# Patient Record
Sex: FEMALE | Race: OTHER | NOT HISPANIC OR LATINO | URBAN - METROPOLITAN AREA
[De-identification: names, ages, dates, MRNs, and addresses within clinical notes are randomized per-mention and may not be internally consistent; named-entity substitution may affect disease eponyms.]

---

## 2021-05-23 ENCOUNTER — INPATIENT (INPATIENT)
Facility: HOSPITAL | Age: 71
LOS: 8 days | Discharge: HOME | DRG: 177 | End: 2021-06-01
Attending: INTERNAL MEDICINE | Admitting: INTERNAL MEDICINE
Payer: MEDICAID

## 2021-05-23 VITALS
SYSTOLIC BLOOD PRESSURE: 131 MMHG | TEMPERATURE: 99 F | RESPIRATION RATE: 18 BRPM | OXYGEN SATURATION: 80 % | DIASTOLIC BLOOD PRESSURE: 81 MMHG | HEART RATE: 108 BPM

## 2021-05-23 DIAGNOSIS — Z29.9 ENCOUNTER FOR PROPHYLACTIC MEASURES, UNSPECIFIED: ICD-10-CM

## 2021-05-23 DIAGNOSIS — R09.02 HYPOXEMIA: ICD-10-CM

## 2021-05-23 DIAGNOSIS — Z78.9 OTHER SPECIFIED HEALTH STATUS: Chronic | ICD-10-CM

## 2021-05-23 DIAGNOSIS — U07.1 COVID-19: ICD-10-CM

## 2021-05-23 DIAGNOSIS — J96.01 ACUTE RESPIRATORY FAILURE WITH HYPOXIA: ICD-10-CM

## 2021-05-23 LAB
ALBUMIN SERPL ELPH-MCNC: 2.3 G/DL — LOW (ref 3.5–5)
ALP SERPL-CCNC: 80 U/L — SIGNIFICANT CHANGE UP (ref 40–120)
ALT FLD-CCNC: 45 U/L DA — SIGNIFICANT CHANGE UP (ref 10–60)
ANION GAP SERPL CALC-SCNC: 9 MMOL/L — SIGNIFICANT CHANGE UP (ref 5–17)
APTT BLD: 28.1 SEC — SIGNIFICANT CHANGE UP (ref 27.5–35.5)
AST SERPL-CCNC: 55 U/L — HIGH (ref 10–40)
BASOPHILS # BLD AUTO: 0.02 K/UL — SIGNIFICANT CHANGE UP (ref 0–0.2)
BASOPHILS NFR BLD AUTO: 0.2 % — SIGNIFICANT CHANGE UP (ref 0–2)
BILIRUB SERPL-MCNC: 0.9 MG/DL — SIGNIFICANT CHANGE UP (ref 0.2–1.2)
BUN SERPL-MCNC: 23 MG/DL — HIGH (ref 7–18)
CALCIUM SERPL-MCNC: 8.8 MG/DL — SIGNIFICANT CHANGE UP (ref 8.4–10.5)
CHLORIDE SERPL-SCNC: 104 MMOL/L — SIGNIFICANT CHANGE UP (ref 96–108)
CO2 SERPL-SCNC: 23 MMOL/L — SIGNIFICANT CHANGE UP (ref 22–31)
CREAT SERPL-MCNC: 0.66 MG/DL — SIGNIFICANT CHANGE UP (ref 0.5–1.3)
D DIMER BLD IA.RAPID-MCNC: 558 NG/ML DDU — HIGH
EOSINOPHIL # BLD AUTO: 0.01 K/UL — SIGNIFICANT CHANGE UP (ref 0–0.5)
EOSINOPHIL NFR BLD AUTO: 0.1 % — SIGNIFICANT CHANGE UP (ref 0–6)
FERRITIN SERPL-MCNC: 628 NG/ML — HIGH (ref 15–150)
GLUCOSE SERPL-MCNC: 121 MG/DL — HIGH (ref 70–99)
HCT VFR BLD CALC: 40.2 % — SIGNIFICANT CHANGE UP (ref 34.5–45)
HGB BLD-MCNC: 13.5 G/DL — SIGNIFICANT CHANGE UP (ref 11.5–15.5)
IMM GRANULOCYTES NFR BLD AUTO: 0.8 % — SIGNIFICANT CHANGE UP (ref 0–1.5)
INR BLD: 1.21 RATIO — HIGH (ref 0.88–1.16)
LYMPHOCYTES # BLD AUTO: 0.65 K/UL — LOW (ref 1–3.3)
LYMPHOCYTES # BLD AUTO: 6.3 % — LOW (ref 13–44)
MCHC RBC-ENTMCNC: 30.3 PG — SIGNIFICANT CHANGE UP (ref 27–34)
MCHC RBC-ENTMCNC: 33.6 GM/DL — SIGNIFICANT CHANGE UP (ref 32–36)
MCV RBC AUTO: 90.3 FL — SIGNIFICANT CHANGE UP (ref 80–100)
MONOCYTES # BLD AUTO: 0.49 K/UL — SIGNIFICANT CHANGE UP (ref 0–0.9)
MONOCYTES NFR BLD AUTO: 4.8 % — SIGNIFICANT CHANGE UP (ref 2–14)
NEUTROPHILS # BLD AUTO: 9 K/UL — HIGH (ref 1.8–7.4)
NEUTROPHILS NFR BLD AUTO: 87.8 % — HIGH (ref 43–77)
NRBC # BLD: 0 /100 WBCS — SIGNIFICANT CHANGE UP (ref 0–0)
NT-PROBNP SERPL-SCNC: 148 PG/ML — HIGH (ref 0–125)
PLATELET # BLD AUTO: 608 K/UL — HIGH (ref 150–400)
POTASSIUM SERPL-MCNC: 4 MMOL/L — SIGNIFICANT CHANGE UP (ref 3.5–5.3)
POTASSIUM SERPL-SCNC: 4 MMOL/L — SIGNIFICANT CHANGE UP (ref 3.5–5.3)
PROT SERPL-MCNC: 7.4 G/DL — SIGNIFICANT CHANGE UP (ref 6–8.3)
PROTHROM AB SERPL-ACNC: 14.3 SEC — HIGH (ref 10.6–13.6)
RBC # BLD: 4.45 M/UL — SIGNIFICANT CHANGE UP (ref 3.8–5.2)
RBC # FLD: 12.3 % — SIGNIFICANT CHANGE UP (ref 10.3–14.5)
SARS-COV-2 RNA SPEC QL NAA+PROBE: DETECTED
SODIUM SERPL-SCNC: 136 MMOL/L — SIGNIFICANT CHANGE UP (ref 135–145)
TROPONIN I SERPL-MCNC: <0.015 NG/ML — SIGNIFICANT CHANGE UP (ref 0–0.04)
WBC # BLD: 10.25 K/UL — SIGNIFICANT CHANGE UP (ref 3.8–10.5)
WBC # FLD AUTO: 10.25 K/UL — SIGNIFICANT CHANGE UP (ref 3.8–10.5)

## 2021-05-23 PROCEDURE — 99285 EMERGENCY DEPT VISIT HI MDM: CPT

## 2021-05-23 PROCEDURE — 71045 X-RAY EXAM CHEST 1 VIEW: CPT | Mod: 26

## 2021-05-23 RX ORDER — ENOXAPARIN SODIUM 100 MG/ML
40 INJECTION SUBCUTANEOUS DAILY
Refills: 0 | Status: DISCONTINUED | OUTPATIENT
Start: 2021-05-23 | End: 2021-06-01

## 2021-05-23 RX ORDER — REMDESIVIR 5 MG/ML
100 INJECTION INTRAVENOUS EVERY 24 HOURS
Refills: 0 | Status: DISCONTINUED | OUTPATIENT
Start: 2021-05-24 | End: 2021-05-25

## 2021-05-23 RX ORDER — DEXAMETHASONE 0.5 MG/5ML
6 ELIXIR ORAL DAILY
Refills: 0 | Status: COMPLETED | OUTPATIENT
Start: 2021-05-24 | End: 2021-06-01

## 2021-05-23 RX ORDER — REMDESIVIR 5 MG/ML
INJECTION INTRAVENOUS
Refills: 0 | Status: DISCONTINUED | OUTPATIENT
Start: 2021-05-23 | End: 2021-05-25

## 2021-05-23 RX ORDER — DEXAMETHASONE 0.5 MG/5ML
6 ELIXIR ORAL ONCE
Refills: 0 | Status: COMPLETED | OUTPATIENT
Start: 2021-05-23 | End: 2021-05-23

## 2021-05-23 RX ORDER — ZINC SULFATE TAB 220 MG (50 MG ZINC EQUIVALENT) 220 (50 ZN) MG
220 TAB ORAL DAILY
Refills: 0 | Status: DISCONTINUED | OUTPATIENT
Start: 2021-05-23 | End: 2021-06-01

## 2021-05-23 RX ORDER — CHOLECALCIFEROL (VITAMIN D3) 125 MCG
5000 CAPSULE ORAL DAILY
Refills: 0 | Status: DISCONTINUED | OUTPATIENT
Start: 2021-05-23 | End: 2021-06-01

## 2021-05-23 RX ORDER — ACETAMINOPHEN 500 MG
650 TABLET ORAL EVERY 6 HOURS
Refills: 0 | Status: DISCONTINUED | OUTPATIENT
Start: 2021-05-23 | End: 2021-06-01

## 2021-05-23 RX ORDER — REMDESIVIR 5 MG/ML
200 INJECTION INTRAVENOUS EVERY 24 HOURS
Refills: 0 | Status: COMPLETED | OUTPATIENT
Start: 2021-05-23 | End: 2021-05-23

## 2021-05-23 RX ORDER — ASCORBIC ACID 60 MG
2000 TABLET,CHEWABLE ORAL DAILY
Refills: 0 | Status: DISCONTINUED | OUTPATIENT
Start: 2021-05-23 | End: 2021-06-01

## 2021-05-23 RX ADMIN — REMDESIVIR 200 MILLIGRAM(S): 5 INJECTION INTRAVENOUS at 22:03

## 2021-05-23 RX ADMIN — ENOXAPARIN SODIUM 40 MILLIGRAM(S): 100 INJECTION SUBCUTANEOUS at 22:03

## 2021-05-23 RX ADMIN — Medication 6 MILLIGRAM(S): at 15:16

## 2021-05-23 RX ADMIN — ZINC SULFATE TAB 220 MG (50 MG ZINC EQUIVALENT) 220 MILLIGRAM(S): 220 (50 ZN) TAB at 22:03

## 2021-05-23 RX ADMIN — Medication 2000 MILLIGRAM(S): at 22:06

## 2021-05-23 NOTE — H&P ADULT - PROBLEM SELECTOR PLAN 3
IMPROVE VTE Individual Risk Assessment  RISK                                                                Points  [  ] Previous VTE                                                  3  [  ] Thrombophilia                                               2  [  ] Lower limb paralysis                                      2        (unable to hold up >15 seconds)    [  ] Current Cancer                                              2         (within 6 months)  [x  ] Immobilization > 24 hrs                                1  [  ] ICU/CCU stay > 24 hours                              1  [ x ] Age > 60                                                      1  IMPROVE VTE Score ___2______  Lovenox for DVT prophylaxis

## 2021-05-23 NOTE — ED ADULT NURSE NOTE - BMI (KG/M2)
General telephone message left for the patient to contact the office regarding her upcoming appointment.  I will attempt to contact the patient once again tomorrow.  
Phone message left for the patient once again today.  I have instructed her to contact the office to schedule a telephone encounter or reschedule her visit.  If she does not do so I will continue to plan to see her on 03/30 as scheduled.  
Telephone visit performed today due to COVID 19.  History of Present Illness:  Telephone call place the patient today which she has now subsequently returned the call to the office.  We discussed her left nondisplaced distal radius fracture as well as her left thumb comminuted distal phalanx fracture after her fall on 01/11/2019.  She is doing well and denies pain.  Her therapy's ended early due to COVID 19; however she states that she only had 2 visits remaining.  She has been consistent with her home exercise program and has no significant limitations in her activities of daily living.  Assessment:  Left distal radius fracture, nondisplaced; subsequent encounter  Left thumb comminuted distal phalanx fracture; subsequent encounter  Plan:  At this time she is doing quite well and has very little limitations in her activities of daily living.  I have encouraged her to continue with her home exercise program.  We will see her back again on an as-needed basis.  
21.5

## 2021-05-23 NOTE — H&P ADULT - HISTORY OF PRESENT ILLNESS
72 y/o female with no significant pmhx presents to ED with c/o body ache and difficulty breathing x 8 days. Patient notes  is admitted to ICU in [place] for Covid. Patient endorses she has leg pain and fever (100.4F),  denies nausea, vomiting, chest pain, calf swelling, diarrhea and abdominal pain. No other known complaints. NKDA.   On arrival noted 80% on RA, now on NC3L 96%. Pt didn't take covid vaccine. taking no meds at home.    [facility] FULL CODE

## 2021-05-23 NOTE — ED PROVIDER NOTE - OBJECTIVE STATEMENT
30 y/o male with no significant pmhx presents to ED with c/o body ache and difficulty breathing. Patient notes recent admission to [place] for Covid. Patient denies nausea, vomiting, chest pain, calf swelling, and abdominal pain. No other known complaints. NKDA. 70 y/o female with no significant pmhx presents to ED with c/o body ache and difficulty breathing. Patient notes  had recent admission to [place] for Covid. Patient denies nausea, vomiting, chest pain, calf swelling, and abdominal pain. No other known complaints. NKDA. noted hypoxia on RA on arrival

## 2021-05-23 NOTE — ED PROVIDER NOTE - CLINICAL SUMMARY MEDICAL DECISION MAKING FREE TEXT BOX
Patient presenting for hypoxia. known covid contact. likely hypoxia 2/2 covid. will obtain lab, dimer, cxr. supplement o2, reassess

## 2021-05-23 NOTE — H&P ADULT - ASSESSMENT
70 y/o female with no significant pmhx presents to ED with c/o body ache and difficulty breathing x 8 days. Patient notes  is admitted to ICU in [place] for Covid. Pt was admitted for acute hypoxic respiratory failure.     On arrival noted 80% on RA, now on NC3L 96%. Pt didn't take covid vaccine. taking no meds at home.

## 2021-05-23 NOTE — H&P ADULT - ATTENDING COMMENTS
COVID positive with subjective c/o SOB and objective mild hypoxia (95% on 3lpm)    -F/U COVID Ab counts  -F/U CT chest angio  -Start on IV dexamethasone  -Start on Iv Remdesivir   -Alessandra IV hydration  -O2 Support vias NC O2 for now  -Pulmonary consult (Dr Boggs)  -Cardiology consult (Dr Turcios)  -GI/DVT PPX COVID positive with subjective c/o SOB and objective mild hypoxia (95% on 3lpm)    -F/U COVID Ab counts  -F/U CT chest angio pending CXR (at present no parameters for PE concern)  -Start on IV dexamethasone  -Start on Iv Remdesivir   -Vitamin C 2,000mg q12 / Zinc 220mg Daily / Vit D Daily   -Alessandra IV hydration  -O2 Support vias NC O2 for now  -Pulmonary consult (Dr Boggs)  -Cardiology consult (Dr Turcios)  -GI/DVT PPX

## 2021-05-23 NOTE — H&P ADULT - PROBLEM SELECTOR PLAN 1
Now on NC 3L spO2: 96%  likely due to covid PNA  c/w oxygen supplement PRN   monitor respiratory status

## 2021-05-23 NOTE — ED PROVIDER NOTE - RESPIRATORY, MLM
Detail Level: Simple Initiate Treatment: Ketoconazole cream bid to the feet and HC 2.5% cream bid prn flares and OTC Zeasorb AF powder qd Breath sounds clear and equal bilaterally.

## 2021-05-23 NOTE — H&P ADULT - PROBLEM SELECTOR PLAN 2
CT chest showed b/l patchy opacities  starting dexamethasone IV 6g daily x 10 days  will start remdesivir x 5 days pending approval, if antibody is negative, DC  oxygen supplement  Monitor respiratory status   c/w vit C, zinc, vit D per attending

## 2021-05-23 NOTE — ED ADULT NURSE NOTE - NSIMPLEMENTINTERV_GEN_ALL_ED
Implemented All Fall with Harm Risk Interventions:  [place] to call system. Call bell, personal items and telephone within reach. Instruct patient to call for assistance. Room bathroom lighting operational. Non-slip footwear when patient is off stretcher. Physically safe environment: no spills, clutter or unnecessary equipment. Stretcher in lowest position, wheels locked, appropriate side rails in place. Provide visual cue, wrist band, yellow gown, etc. Monitor gait and stability. Monitor for mental status changes and reorient to person, place, and time. Review medications for side effects contributing to fall risk. Reinforce activity limits and safety measures with patient and family. Provide visual clues: red socks.

## 2021-05-24 LAB
24R-OH-CALCIDIOL SERPL-MCNC: 24.3 NG/ML — LOW (ref 30–80)
A1C WITH ESTIMATED AVERAGE GLUCOSE RESULT: 6 % — HIGH (ref 4–5.6)
ALBUMIN SERPL ELPH-MCNC: 2.1 G/DL — LOW (ref 3.5–5)
ALP SERPL-CCNC: 83 U/L — SIGNIFICANT CHANGE UP (ref 40–120)
ALT FLD-CCNC: 41 U/L DA — SIGNIFICANT CHANGE UP (ref 10–60)
ANION GAP SERPL CALC-SCNC: 11 MMOL/L — SIGNIFICANT CHANGE UP (ref 5–17)
AST SERPL-CCNC: 40 U/L — SIGNIFICANT CHANGE UP (ref 10–40)
BASOPHILS # BLD AUTO: 0.01 K/UL — SIGNIFICANT CHANGE UP (ref 0–0.2)
BASOPHILS NFR BLD AUTO: 0.2 % — SIGNIFICANT CHANGE UP (ref 0–2)
BILIRUB SERPL-MCNC: 0.6 MG/DL — SIGNIFICANT CHANGE UP (ref 0.2–1.2)
BUN SERPL-MCNC: 20 MG/DL — HIGH (ref 7–18)
CALCIUM SERPL-MCNC: 8.7 MG/DL — SIGNIFICANT CHANGE UP (ref 8.4–10.5)
CHLORIDE SERPL-SCNC: 105 MMOL/L — SIGNIFICANT CHANGE UP (ref 96–108)
CHOLEST SERPL-MCNC: 129 MG/DL — SIGNIFICANT CHANGE UP
CO2 SERPL-SCNC: 23 MMOL/L — SIGNIFICANT CHANGE UP (ref 22–31)
COVID-19 SPIKE DOMAIN AB INTERP: POSITIVE
COVID-19 SPIKE DOMAIN ANTIBODY RESULT: 222 U/ML — HIGH
CREAT SERPL-MCNC: 0.52 MG/DL — SIGNIFICANT CHANGE UP (ref 0.5–1.3)
CRP SERPL-MCNC: 144 MG/L — HIGH
EOSINOPHIL # BLD AUTO: 0 K/UL — SIGNIFICANT CHANGE UP (ref 0–0.5)
EOSINOPHIL NFR BLD AUTO: 0 % — SIGNIFICANT CHANGE UP (ref 0–6)
ERYTHROCYTE [SEDIMENTATION RATE] IN BLOOD: 86 MM/HR — HIGH (ref 0–20)
ESTIMATED AVERAGE GLUCOSE: 126 MG/DL — HIGH (ref 68–114)
FERRITIN SERPL-MCNC: 636 NG/ML — HIGH (ref 15–150)
FOLATE SERPL-MCNC: 13.4 NG/ML — SIGNIFICANT CHANGE UP
GLUCOSE SERPL-MCNC: 156 MG/DL — HIGH (ref 70–99)
HCT VFR BLD CALC: 37.5 % — SIGNIFICANT CHANGE UP (ref 34.5–45)
HDLC SERPL-MCNC: 36 MG/DL — LOW
HGB BLD-MCNC: 12.8 G/DL — SIGNIFICANT CHANGE UP (ref 11.5–15.5)
IMM GRANULOCYTES NFR BLD AUTO: 0.9 % — SIGNIFICANT CHANGE UP (ref 0–1.5)
LACTATE SERPL-SCNC: 1.5 MMOL/L — SIGNIFICANT CHANGE UP (ref 0.7–2)
LIPID PNL WITH DIRECT LDL SERPL: 75 MG/DL — SIGNIFICANT CHANGE UP
LYMPHOCYTES # BLD AUTO: 0.81 K/UL — LOW (ref 1–3.3)
LYMPHOCYTES # BLD AUTO: 12.7 % — LOW (ref 13–44)
MAGNESIUM SERPL-MCNC: 2.5 MG/DL — SIGNIFICANT CHANGE UP (ref 1.6–2.6)
MCHC RBC-ENTMCNC: 30.7 PG — SIGNIFICANT CHANGE UP (ref 27–34)
MCHC RBC-ENTMCNC: 34.1 GM/DL — SIGNIFICANT CHANGE UP (ref 32–36)
MCV RBC AUTO: 89.9 FL — SIGNIFICANT CHANGE UP (ref 80–100)
MONOCYTES # BLD AUTO: 0.17 K/UL — SIGNIFICANT CHANGE UP (ref 0–0.9)
MONOCYTES NFR BLD AUTO: 2.7 % — SIGNIFICANT CHANGE UP (ref 2–14)
NEUTROPHILS # BLD AUTO: 5.32 K/UL — SIGNIFICANT CHANGE UP (ref 1.8–7.4)
NEUTROPHILS NFR BLD AUTO: 83.5 % — HIGH (ref 43–77)
NON HDL CHOLESTEROL: 93 MG/DL — SIGNIFICANT CHANGE UP
NRBC # BLD: 0 /100 WBCS — SIGNIFICANT CHANGE UP (ref 0–0)
PHOSPHATE SERPL-MCNC: 2.8 MG/DL — SIGNIFICANT CHANGE UP (ref 2.5–4.5)
PLATELET # BLD AUTO: 659 K/UL — HIGH (ref 150–400)
POTASSIUM SERPL-MCNC: 3.9 MMOL/L — SIGNIFICANT CHANGE UP (ref 3.5–5.3)
POTASSIUM SERPL-SCNC: 3.9 MMOL/L — SIGNIFICANT CHANGE UP (ref 3.5–5.3)
PROCALCITONIN SERPL-MCNC: 0.1 NG/ML — SIGNIFICANT CHANGE UP (ref 0.02–0.1)
PROCALCITONIN SERPL-MCNC: 0.1 NG/ML — SIGNIFICANT CHANGE UP (ref 0.02–0.1)
PROT SERPL-MCNC: 7.3 G/DL — SIGNIFICANT CHANGE UP (ref 6–8.3)
RBC # BLD: 4.17 M/UL — SIGNIFICANT CHANGE UP (ref 3.8–5.2)
RBC # FLD: 12.5 % — SIGNIFICANT CHANGE UP (ref 10.3–14.5)
SARS-COV-2 IGG+IGM SERPL QL IA: 222 U/ML — HIGH
SARS-COV-2 IGG+IGM SERPL QL IA: POSITIVE
SODIUM SERPL-SCNC: 139 MMOL/L — SIGNIFICANT CHANGE UP (ref 135–145)
TRIGL SERPL-MCNC: 92 MG/DL — SIGNIFICANT CHANGE UP
TROPONIN I SERPL-MCNC: <0.015 NG/ML — SIGNIFICANT CHANGE UP (ref 0–0.04)
TSH SERPL-MCNC: 0.38 UU/ML — SIGNIFICANT CHANGE UP (ref 0.34–4.82)
VIT B12 SERPL-MCNC: 1225 PG/ML — SIGNIFICANT CHANGE UP (ref 232–1245)
WBC # BLD: 6.37 K/UL — SIGNIFICANT CHANGE UP (ref 3.8–10.5)
WBC # FLD AUTO: 6.37 K/UL — SIGNIFICANT CHANGE UP (ref 3.8–10.5)

## 2021-05-24 RX ADMIN — ENOXAPARIN SODIUM 40 MILLIGRAM(S): 100 INJECTION SUBCUTANEOUS at 11:02

## 2021-05-24 RX ADMIN — Medication 2000 MILLIGRAM(S): at 11:02

## 2021-05-24 RX ADMIN — Medication 6 MILLIGRAM(S): at 05:23

## 2021-05-24 RX ADMIN — ZINC SULFATE TAB 220 MG (50 MG ZINC EQUIVALENT) 220 MILLIGRAM(S): 220 (50 ZN) TAB at 11:02

## 2021-05-24 RX ADMIN — REMDESIVIR 500 MILLIGRAM(S): 5 INJECTION INTRAVENOUS at 22:39

## 2021-05-24 RX ADMIN — Medication 5000 UNIT(S): at 11:02

## 2021-05-24 NOTE — PROGRESS NOTE ADULT - PROBLEM SELECTOR PLAN 2
Elevated inflammatory markers like ESR, CRP, Ferritin.   Continue dexamethasone IV 6g daily x 10 days  will start remdesivir x 5 days pending approval, if antibody is negative, DC  c/w vit C, zinc, vit D per attending  Encourage prone positioning.

## 2021-05-24 NOTE — PROGRESS NOTE ADULT - SUBJECTIVE AND OBJECTIVE BOX
PGY 3 Note discussed with primary attending    Patient is a 71y old  Female who presents with a chief complaint of Acute hypoxic respiratory failure (23 May 2021 19:38)      INTERVAL HPI/OVERNIGHT EVENTS: Pt was walking to rest room and she desaturated to 87% on 4L NC and she was placed on 5L NC. She is comfortable, speaking in full sentences and no accessory muscle use. She had breakfast today. Pt had bowel movement in the morning.     MEDICATIONS  (STANDING):  ascorbic acid 2000 milliGRAM(s) Oral daily  cholecalciferol 5000 Unit(s) Oral daily  dexAMETHasone  Injectable 6 milliGRAM(s) IV Push daily  enoxaparin Injectable 40 milliGRAM(s) SubCutaneous daily  remdesivir  IVPB 100 milliGRAM(s) IV Intermittent every 24 hours  remdesivir  IVPB   IV Intermittent   zinc sulfate 220 milliGRAM(s) Oral daily    MEDICATIONS  (PRN):  acetaminophen   Tablet .. 650 milliGRAM(s) Oral every 6 hours PRN Temp greater or equal to 38C (100.4F), Mild Pain (1 - 3)      __________________________________________________  REVIEW OF SYSTEMS:    CONSTITUTIONAL: No fever,   EYES: no acute visual disturbances  NECK: No pain or stiffness  RESPIRATORY: No cough but  shortness of breath is present  CARDIOVASCULAR: No chest pain, no palpitations  GASTROINTESTINAL: No pain. No nausea or vomiting; No diarrhea   NEUROLOGICAL: No headache or numbness, no tremors  MUSCULOSKELETAL: No joint pain, no muscle pain  GENITOURINARY: no dysuria, no frequency, no hesitancy        Vital Signs Last 24 Hrs  T(C): 35.8 (24 May 2021 05:30), Max: 37.2 (23 May 2021 15:00)  T(F): 96.4 (24 May 2021 05:30), Max: 99 (23 May 2021 15:00)  HR: 69 (24 May 2021 05:30) (69 - 108)  BP: 116/46 (24 May 2021 05:40) (115/32 - 135/44)  BP(mean): 63 (24 May 2021 05:40) (52 - 65)  RR: 16 (24 May 2021 06:59) (16 - 22)  SpO2: 93% (24 May 2021 06:59) (80% - 100%)    ________________________________________________  PHYSICAL EXAM:  GENERAL: NAD  HEENT: Normocephalic;  conjunctivae and sclerae clear; moist mucous membranes;   NECK : supple  CHEST/LUNG: Bilateral crackles are present  HEART: S1 S2  regular; no murmurs, gallops or rubs  ABDOMEN: Soft, Nontender, Nondistended; Bowel sounds present  EXTREMITIES: no cyanosis; no edema; no calf tenderness  SKIN: warm and dry; no rash  NERVOUS SYSTEM:  Awake and alert; Oriented  to place, person and time ; no new deficits    _________________________________________________  LABS:                        12.8   6.37  )-----------( 659      ( 24 May 2021 11:10 )             37.5     05-24    139  |  105  |  20<H>  ----------------------------<  156<H>  3.9   |  23  |  0.52    Ca    8.7      24 May 2021 11:10  Phos  2.8     05-24  Mg     2.5     05-24    TPro  7.3  /  Alb  2.1<L>  /  TBili  0.6  /  DBili  x   /  AST  40  /  ALT  41  /  AlkPhos  83  05-24    PT/INR - ( 23 May 2021 15:27 )   PT: 14.3 sec;   INR: 1.21 ratio         PTT - ( 23 May 2021 15:27 )  PTT:28.1 sec    CAPILLARY BLOOD GLUCOSE            RADIOLOGY & ADDITIONAL TESTS:    Imaging Personally Reviewed:  YES    Consultant(s) Notes Reviewed:   YES    Care Discussed with Consultants : YES     Plan of care was discussed with patient and /or primary care giver; all questions and concerns were addressed and care was aligned with patient's wishes.

## 2021-05-24 NOTE — CONSULT NOTE ADULT - SUBJECTIVE AND OBJECTIVE BOX
Time of visit:    CHIEF COMPLAINT: Patient is a 71y old  Female who presents with a chief complaint of Acute hypoxic respiratory failure (23 May 2021 19:38)      HPI:  72 y/o female with no significant pmhx presents to ED with c/o body ache and difficulty breathing x 8 days. Patient notes  is admitted to ICU in [place] for Covid. Patient endorses she has leg pain and fever (100.4F),  denies nausea, vomiting, chest pain, calf swelling, diarrhea and abdominal pain. No other known complaints. NKDA.   On arrival noted 80% on RA, now on NC3L 96%. Pt didn't take covid vaccine. taking no meds at home.    [facility]: FULL CODE   (23 May 2021 19:38)   Patient seen and examined.     PAST MEDICAL & SURGICAL HISTORY:  No pertinent past medical history    No pertinent past medical history    No significant past surgical history    No pertinent past surgical history        Allergies    No Known Allergies    Intolerances        MEDICATIONS  (STANDING):  ascorbic acid 2000 milliGRAM(s) Oral daily  cholecalciferol 5000 Unit(s) Oral daily  dexAMETHasone  Injectable 6 milliGRAM(s) IV Push daily  enoxaparin Injectable 40 milliGRAM(s) SubCutaneous daily  remdesivir  IVPB 100 milliGRAM(s) IV Intermittent every 24 hours  remdesivir  IVPB   IV Intermittent   zinc sulfate 220 milliGRAM(s) Oral daily      MEDICATIONS  (PRN):  acetaminophen   Tablet .. 650 milliGRAM(s) Oral every 6 hours PRN Temp greater or equal to 38C (100.4F), Mild Pain (1 - 3)   Medications up to date at time of exam.    Medications up to date at time of exam.    FAMILY HISTORY:      SOCIAL HISTORY  Smoking History: Denies smoking exposure.   Living Condition: [   ] apartment, [   ] private house  Work History:   Travel History: denies recent travel  Illicit Substance Use: denies  Alcohol Use: denies    REVIEW OF SYSTEMS:    CONSTITUTIONAL:  No fevers, chills. Denies Night sweats.     HEENT:  denies diplopia or blurred vision, sore throat or runny nose.    CARDIOVASCULAR:  denies pressure, squeezing, tightness, or heaviness about the chest; no palpitations.    RESPIRATORY:  Episode of  SOB but no active SOB on exam. No cough. No wheezing on exam .    GASTROINTESTINAL:  denies abdominal pain, nausea, vomiting or diarrhea.    GENITOURINARY: denies dysuria, frequency or urgency.    NEUROLOGIC:  denies numbness, tingling, seizures or weakness.    PSYCHIATRIC:  denies disorder of thought or mood.    MSK: denies swelling, redness      PHYSICAL EXAMINATION:    GENERAL: The patient is a well-developed, well-nourished, in no apparent distress.     Vital Signs Last 24 Hrs  T(C): 35.8 (24 May 2021 05:30), Max: 37.2 (23 May 2021 15:00)  T(F): 96.4 (24 May 2021 05:30), Max: 99 (23 May 2021 15:00)  HR: 69 (24 May 2021 05:30) (69 - 108)  BP: 116/46 (24 May 2021 05:40) (115/32 - 135/44)  BP(mean): 63 (24 May 2021 05:40) (52 - 65)  RR: 16 (24 May 2021 06:59) (16 - 22)  SpO2: 93% (24 May 2021 06:59) (80% - 100%)   (if applicable)    Chest Tube (if applicable)    HEENT: Head is normocephalic and atraumatic. No nasal tenderness. Mucous membranes are moist.     NECK: Supple, no palpable adenopathy.    LUNGS: Clear to auscultation bilaterally with no wheezing, rales, or rhonchi. No use of accessory muscle.     HEART: S1 S2 Regular rate and no click/ rub.     ABDOMEN: Soft, nontender, and nondistended.  No hepatosplenomegaly is noted. Active bowel sounds.     EXTREMITIES: Without any cyanosis, clubbing, rash, lesions or edema.    NEUROLOGIC: Awake, alert, oriented.     SKIN: Warm and moist. Non diaphoretic.       LABS:                        12.8   6.37  )-----------( 659      ( 24 May 2021 11:10 )             37.5     05-24    139  |  105  |  20<H>  ----------------------------<  156<H>  3.9   |  23  |  0.52    Ca    8.7      24 May 2021 11:10  Phos  2.8     05-24  Mg     2.5     05-24    TPro  7.3  /  Alb  2.1<L>  /  TBili  0.6  /  DBili  x   /  AST  40  /  ALT  41  /  AlkPhos  83  05-24    PT/INR - ( 23 May 2021 15:27 )   PT: 14.3 sec;   INR: 1.21 ratio         PTT - ( 23 May 2021 15:27 )  PTT:28.1 sec      CARDIAC MARKERS ( 24 May 2021 11:10 )  <0.015 ng/mL / x     / x     / x     / x      CARDIAC MARKERS ( 23 May 2021 15:27 )  <0.015 ng/mL / x     / x     / x     / x          D-Dimer Assay, Quantitative: 558 ng/mL DDU (05-23-21 @ 15:27)    Serum Pro-Brain Natriuretic Peptide: 148 pg/mL (05-23-21 @ 15:27)    Lactate, Blood: 1.5 mmol/L (05-24-21 @ 11:10)    Procalcitonin, Serum: 0.10 ng/mL (05-23-21 @ 23:27)      MICROBIOLOGY: (if applicable)    RADIOLOGY & ADDITIONAL STUDIES:  EKG:   CXR:  ECHO:    IMPRESSION: 71y Female PAST MEDICAL & SURGICAL HISTORY:  No pertinent past medical history    No pertinent past medical history    No significant past surgical history    No pertinent past surgical history     Impression: 70 Y/O Female presented to ED with body ache and difficulty breathing x 8 days. Patient notes  is admitted to ICU in [place] for Covid.  With O2 saturation of 80% in ED with episode f Difficulty due to Acute Hypoxic Respiratory Failure secondary to Covid Pneumonia . Positive Covid 19 PCR on 05-23-21. CXR with bilateral Infiltrates.     Suggestion:  O2 saturation 92% and continue Oxygen supplementation 3L-4L NC. Monitor O2 saturation closely .   Dexamethasone 6 mg Daily x 10 days.   Pulmonary oral hygiene care.   Remdesivir  x 5 days. Awaiting Antibody test result.   DVT/ GI prophylactic.      Time of visit:    CHIEF COMPLAINT: Patient is a 71y old  Female who presents with a chief complaint of Acute hypoxic respiratory failure (23 May 2021 19:38)      HPI:  70 y/o female with no significant pmhx presents to ED with c/o body ache and difficulty breathing x 8 days. Patient notes  is admitted to ICU in [place] for Covid. Patient endorses she has leg pain and fever (100.4F),  denies nausea, vomiting, chest pain, calf swelling, diarrhea and abdominal pain. No other known complaints. NKDA.   On arrival noted 80% on RA, now on NC3L 96%. Pt didn't take covid vaccine. taking no meds at home.    [facility]: FULL CODE   (23 May 2021 19:38)   Patient seen and examined.     PAST MEDICAL & SURGICAL HISTORY:  No pertinent past medical history    No pertinent past medical history    No significant past surgical history    No pertinent past surgical history        Allergies    No Known Allergies    Intolerances        MEDICATIONS  (STANDING):  ascorbic acid 2000 milliGRAM(s) Oral daily  cholecalciferol 5000 Unit(s) Oral daily  dexAMETHasone  Injectable 6 milliGRAM(s) IV Push daily  enoxaparin Injectable 40 milliGRAM(s) SubCutaneous daily  remdesivir  IVPB 100 milliGRAM(s) IV Intermittent every 24 hours  remdesivir  IVPB   IV Intermittent   zinc sulfate 220 milliGRAM(s) Oral daily      MEDICATIONS  (PRN):  acetaminophen   Tablet .. 650 milliGRAM(s) Oral every 6 hours PRN Temp greater or equal to 38C (100.4F), Mild Pain (1 - 3)   Medications up to date at time of exam.    Medications up to date at time of exam.    FAMILY HISTORY:      SOCIAL HISTORY  Smoking History: Denies smoking exposure.   Living Condition: [   ] apartment, [   ] private house  Work History:   Travel History: denies recent travel  Illicit Substance Use: denies  Alcohol Use: denies    REVIEW OF SYSTEMS:    CONSTITUTIONAL:  No fevers, chills. Denies Night sweats.     HEENT:  denies diplopia or blurred vision, sore throat or runny nose.    CARDIOVASCULAR:  denies pressure, squeezing, tightness, or heaviness about the chest; no palpitations.    RESPIRATORY:  Episode of  SOB but no active SOB on exam. No cough. No wheezing on exam .    GASTROINTESTINAL:  denies abdominal pain, nausea, vomiting or diarrhea.    GENITOURINARY: denies dysuria, frequency or urgency.    NEUROLOGIC:  denies numbness, tingling, seizures or weakness.    PSYCHIATRIC:  denies disorder of thought or mood.    MSK: denies swelling, redness      PHYSICAL EXAMINATION:    GENERAL: The patient is a well-developed, well-nourished, in no apparent distress.     Vital Signs Last 24 Hrs  T(C): 35.8 (24 May 2021 05:30), Max: 37.2 (23 May 2021 15:00)  T(F): 96.4 (24 May 2021 05:30), Max: 99 (23 May 2021 15:00)  HR: 69 (24 May 2021 05:30) (69 - 108)  BP: 116/46 (24 May 2021 05:40) (115/32 - 135/44)  BP(mean): 63 (24 May 2021 05:40) (52 - 65)  RR: 16 (24 May 2021 06:59) (16 - 22)  SpO2: 93% (24 May 2021 06:59) (80% - 100%)   (if applicable)    Chest Tube (if applicable)    HEENT: Head is normocephalic and atraumatic. No nasal tenderness. Mucous membranes are moist.     NECK: Supple, no palpable adenopathy.    LUNGS: Clear to auscultation bilaterally with no wheezing, rales, or rhonchi. No use of accessory muscle.     HEART: S1 S2 Regular rate and no click/ rub.     ABDOMEN: Soft, nontender, and nondistended.  No hepatosplenomegaly is noted. Active bowel sounds.     EXTREMITIES: Without any cyanosis, clubbing, rash, lesions or edema.    NEUROLOGIC: Awake, alert, oriented.     SKIN: Warm and moist. Non diaphoretic.       LABS:                        12.8   6.37  )-----------( 659      ( 24 May 2021 11:10 )             37.5     05-24    139  |  105  |  20<H>  ----------------------------<  156<H>  3.9   |  23  |  0.52    Ca    8.7      24 May 2021 11:10  Phos  2.8     05-24  Mg     2.5     05-24    TPro  7.3  /  Alb  2.1<L>  /  TBili  0.6  /  DBili  x   /  AST  40  /  ALT  41  /  AlkPhos  83  05-24    PT/INR - ( 23 May 2021 15:27 )   PT: 14.3 sec;   INR: 1.21 ratio         PTT - ( 23 May 2021 15:27 )  PTT:28.1 sec      CARDIAC MARKERS ( 24 May 2021 11:10 )  <0.015 ng/mL / x     / x     / x     / x      CARDIAC MARKERS ( 23 May 2021 15:27 )  <0.015 ng/mL / x     / x     / x     / x          D-Dimer Assay, Quantitative: 558 ng/mL DDU (05-23-21 @ 15:27)    Serum Pro-Brain Natriuretic Peptide: 148 pg/mL (05-23-21 @ 15:27)    Lactate, Blood: 1.5 mmol/L (05-24-21 @ 11:10)    Procalcitonin, Serum: 0.10 ng/mL (05-23-21 @ 23:27)      MICROBIOLOGY: (if applicable)    RADIOLOGY & ADDITIONAL STUDIES:  EKG:   CXR:  ECHO:    IMPRESSION: 71y Female PAST MEDICAL & SURGICAL HISTORY:  No pertinent past medical history    No pertinent past medical history    No significant past surgical history    No pertinent past surgical history     Impression: 70 Y/O Female presented to ED with body ache and difficulty breathing x 8 days. Patient notes  is admitted to ICU in [place] for Covid.  With O2 saturation of 80% in ED with episode f Difficulty due to Acute Hypoxic Respiratory Failure secondary to Covid Pneumonia . Positive Covid 19 PCR on 05-23-21. CXR with bilateral Infiltrates.     Suggestion:  O2 saturation 92% and continue Oxygen supplementation 3L-4L NC. Monitor O2 saturation closely .   Dexamethasone 6 mg Daily x 10 days.   Pulmonary oral hygiene care.   Remdesivir  x 5 days. Awaiting Antibody test result.   DVT/ GI prophylactic.       Airborne and contact precautions.   Time of visit:    CHIEF COMPLAINT: Patient is a 71y old  Female who presents with a chief complaint of Acute hypoxic respiratory failure (23 May 2021 19:38)      HPI:  70 y/o female with no significant pmhx presents to ED with c/o body ache and difficulty breathing x 8 days. Patient notes  is admitted to ICU in [place] for Covid. Patient endorses she has leg pain and fever (100.4F),  denies nausea, vomiting, chest pain, calf swelling, diarrhea and abdominal pain. No other known complaints. NKDA.   On arrival noted 80% on RA, now on NC3L 96%. Pt didn't take covid vaccine. taking no meds at home.    [facility]: FULL CODE   (23 May 2021 19:38)   Patient seen and examined.     PAST MEDICAL & SURGICAL HISTORY:  No pertinent past medical history    No pertinent past medical history    No significant past surgical history    No pertinent past surgical history        Allergies    No Known Allergies    Intolerances        MEDICATIONS  (STANDING):  ascorbic acid 2000 milliGRAM(s) Oral daily  cholecalciferol 5000 Unit(s) Oral daily  dexAMETHasone  Injectable 6 milliGRAM(s) IV Push daily  enoxaparin Injectable 40 milliGRAM(s) SubCutaneous daily  remdesivir  IVPB 100 milliGRAM(s) IV Intermittent every 24 hours  remdesivir  IVPB   IV Intermittent   zinc sulfate 220 milliGRAM(s) Oral daily      MEDICATIONS  (PRN):  acetaminophen   Tablet .. 650 milliGRAM(s) Oral every 6 hours PRN Temp greater or equal to 38C (100.4F), Mild Pain (1 - 3)   Medications up to date at time of exam.    Medications up to date at time of exam.    FAMILY HISTORY:      SOCIAL HISTORY  Smoking History: Denies smoking exposure.   Living Condition: [   ] apartment, [   ] private house  Work History:   Travel History: denies recent travel  Illicit Substance Use: denies  Alcohol Use: denies    REVIEW OF SYSTEMS:    CONSTITUTIONAL:  No fevers, chills. Denies Night sweats.     HEENT:  denies diplopia or blurred vision, sore throat or runny nose.    CARDIOVASCULAR:  denies pressure, squeezing, tightness, or heaviness about the chest; no palpitations.    RESPIRATORY:  Episode of  SOB but no active SOB on exam. No cough. No wheezing on exam .    GASTROINTESTINAL:  denies abdominal pain, nausea, vomiting or diarrhea.    GENITOURINARY: denies dysuria, frequency or urgency.    NEUROLOGIC:  denies numbness, tingling, seizures or weakness.    PSYCHIATRIC:  denies disorder of thought or mood.    MSK: denies swelling, redness      PHYSICAL EXAMINATION:    GENERAL: The patient is a well-developed, well-nourished, in no apparent distress.     Vital Signs Last 24 Hrs  T(C): 35.8 (24 May 2021 05:30), Max: 37.2 (23 May 2021 15:00)  T(F): 96.4 (24 May 2021 05:30), Max: 99 (23 May 2021 15:00)  HR: 69 (24 May 2021 05:30) (69 - 108)  BP: 116/46 (24 May 2021 05:40) (115/32 - 135/44)  BP(mean): 63 (24 May 2021 05:40) (52 - 65)  RR: 16 (24 May 2021 06:59) (16 - 22)  SpO2: 93% (24 May 2021 06:59) (80% - 100%)   (if applicable)    Chest Tube (if applicable)    HEENT: Head is normocephalic and atraumatic. No nasal tenderness. Mucous membranes are moist.     NECK: Supple, no palpable adenopathy.    LUNGS: Clear to auscultation bilaterally with no wheezing, rales, or rhonchi. No use of accessory muscle.     HEART: S1 S2 Regular rate and no click/ rub.     ABDOMEN: Soft, nontender, and nondistended.  No hepatosplenomegaly is noted. Active bowel sounds.     EXTREMITIES: Without any cyanosis, clubbing, rash, lesions or edema.    NEUROLOGIC: Awake, alert, oriented.     SKIN: Warm and moist. Non diaphoretic.       LABS:                        12.8   6.37  )-----------( 659      ( 24 May 2021 11:10 )             37.5     05-24    139  |  105  |  20<H>  ----------------------------<  156<H>  3.9   |  23  |  0.52    Ca    8.7      24 May 2021 11:10  Phos  2.8     05-24  Mg     2.5     05-24    TPro  7.3  /  Alb  2.1<L>  /  TBili  0.6  /  DBili  x   /  AST  40  /  ALT  41  /  AlkPhos  83  05-24    PT/INR - ( 23 May 2021 15:27 )   PT: 14.3 sec;   INR: 1.21 ratio         PTT - ( 23 May 2021 15:27 )  PTT:28.1 sec      CARDIAC MARKERS ( 24 May 2021 11:10 )  <0.015 ng/mL / x     / x     / x     / x      CARDIAC MARKERS ( 23 May 2021 15:27 )  <0.015 ng/mL / x     / x     / x     / x          D-Dimer Assay, Quantitative: 558 ng/mL DDU (05-23-21 @ 15:27)    Serum Pro-Brain Natriuretic Peptide: 148 pg/mL (05-23-21 @ 15:27)    Lactate, Blood: 1.5 mmol/L (05-24-21 @ 11:10)    Procalcitonin, Serum: 0.10 ng/mL (05-23-21 @ 23:27)      MICROBIOLOGY: (if applicable)    RADIOLOGY & ADDITIONAL STUDIES:  EKG:   CXR:  ECHO:    IMPRESSION: 71y Female PAST MEDICAL & SURGICAL HISTORY:  No pertinent past medical history    No pertinent past medical history    No significant past surgical history    No pertinent past surgical history     Impression: 70 Y/O Female presented to ED with body ache and difficulty breathing x 8 days. Patient notes  is admitted to ICU in [place] for Covid.  With O2 saturation of 80% in ED with episode f Difficulty due to Acute Hypoxic Respiratory Failure secondary to  Pneumonia from covid-19 infection . . Positive Covid 19 PCR on 05-23-21. CXR with bilateral Infiltrates.     Suggestion:  O2 saturation 92% and continue Oxygen supplementation 3L-4L NC. Monitor O2 saturation closely .   Dexamethasone 6 mg Daily x 10 days.   Pulmonary oral hygiene care.   Remdesivir  x 5 days. Awaiting Antibody test result.   monitor biomarkes   DVT/ GI prophylactic.       Airborne and contact precautions.

## 2021-05-24 NOTE — PROGRESS NOTE ADULT - PROBLEM SELECTOR PLAN 1
Now on NC 5L spO2: 93%- 94%  likely due to covid PNA  Less likely PE in the setting of normal D dimer and no tachycardia  CXR showed b/l infiltrates left more than right.  c/w oxygen supplementation with spO2> 94%  monitor respiratory status  Will check inflammatory markers

## 2021-05-24 NOTE — PROGRESS NOTE ADULT - ASSESSMENT
72 y/o female with no significant pmhx presents to ED with c/o body ache and difficulty breathing x 8 days. Patient notes  is admitted to ICU in [place] for Covid. Pt was admitted for acute hypoxic respiratory failure due to COVID.     On arrival noted 80% on RA, now on NC3L 96%. Pt didn't take covid vaccine. taking no meds at home.

## 2021-05-25 LAB
ALBUMIN SERPL ELPH-MCNC: 1.9 G/DL — LOW (ref 3.5–5)
ALP SERPL-CCNC: 70 U/L — SIGNIFICANT CHANGE UP (ref 40–120)
ALT FLD-CCNC: 33 U/L DA — SIGNIFICANT CHANGE UP (ref 10–60)
ANION GAP SERPL CALC-SCNC: 7 MMOL/L — SIGNIFICANT CHANGE UP (ref 5–17)
AST SERPL-CCNC: 27 U/L — SIGNIFICANT CHANGE UP (ref 10–40)
BASOPHILS # BLD AUTO: 0.01 K/UL — SIGNIFICANT CHANGE UP (ref 0–0.2)
BASOPHILS NFR BLD AUTO: 0.1 % — SIGNIFICANT CHANGE UP (ref 0–2)
BILIRUB SERPL-MCNC: 0.4 MG/DL — SIGNIFICANT CHANGE UP (ref 0.2–1.2)
BUN SERPL-MCNC: 22 MG/DL — HIGH (ref 7–18)
CALCIUM SERPL-MCNC: 8.5 MG/DL — SIGNIFICANT CHANGE UP (ref 8.4–10.5)
CHLORIDE SERPL-SCNC: 109 MMOL/L — HIGH (ref 96–108)
CO2 SERPL-SCNC: 25 MMOL/L — SIGNIFICANT CHANGE UP (ref 22–31)
CREAT SERPL-MCNC: 0.44 MG/DL — LOW (ref 0.5–1.3)
D DIMER BLD IA.RAPID-MCNC: 492 NG/ML DDU — HIGH
EOSINOPHIL # BLD AUTO: 0.01 K/UL — SIGNIFICANT CHANGE UP (ref 0–0.5)
EOSINOPHIL NFR BLD AUTO: 0.1 % — SIGNIFICANT CHANGE UP (ref 0–6)
FERRITIN SERPL-MCNC: 607 NG/ML — HIGH (ref 15–150)
GLUCOSE SERPL-MCNC: 98 MG/DL — SIGNIFICANT CHANGE UP (ref 70–99)
HCT VFR BLD CALC: 34.7 % — SIGNIFICANT CHANGE UP (ref 34.5–45)
HCV AB S/CO SERPL IA: 0.16 S/CO — SIGNIFICANT CHANGE UP (ref 0–0.99)
HCV AB SERPL-IMP: SIGNIFICANT CHANGE UP
HGB BLD-MCNC: 11.6 G/DL — SIGNIFICANT CHANGE UP (ref 11.5–15.5)
IMM GRANULOCYTES NFR BLD AUTO: 1 % — SIGNIFICANT CHANGE UP (ref 0–1.5)
LDH SERPL L TO P-CCNC: 309 U/L — HIGH (ref 120–225)
LYMPHOCYTES # BLD AUTO: 1.25 K/UL — SIGNIFICANT CHANGE UP (ref 1–3.3)
LYMPHOCYTES # BLD AUTO: 13.7 % — SIGNIFICANT CHANGE UP (ref 13–44)
MAGNESIUM SERPL-MCNC: 2.2 MG/DL — SIGNIFICANT CHANGE UP (ref 1.6–2.6)
MCHC RBC-ENTMCNC: 30.2 PG — SIGNIFICANT CHANGE UP (ref 27–34)
MCHC RBC-ENTMCNC: 33.4 GM/DL — SIGNIFICANT CHANGE UP (ref 32–36)
MCV RBC AUTO: 90.4 FL — SIGNIFICANT CHANGE UP (ref 80–100)
MONOCYTES # BLD AUTO: 0.5 K/UL — SIGNIFICANT CHANGE UP (ref 0–0.9)
MONOCYTES NFR BLD AUTO: 5.5 % — SIGNIFICANT CHANGE UP (ref 2–14)
NEUTROPHILS # BLD AUTO: 7.29 K/UL — SIGNIFICANT CHANGE UP (ref 1.8–7.4)
NEUTROPHILS NFR BLD AUTO: 79.6 % — HIGH (ref 43–77)
NRBC # BLD: 0 /100 WBCS — SIGNIFICANT CHANGE UP (ref 0–0)
PHOSPHATE SERPL-MCNC: 2.6 MG/DL — SIGNIFICANT CHANGE UP (ref 2.5–4.5)
PLATELET # BLD AUTO: 702 K/UL — HIGH (ref 150–400)
POTASSIUM SERPL-MCNC: 3.3 MMOL/L — LOW (ref 3.5–5.3)
POTASSIUM SERPL-SCNC: 3.3 MMOL/L — LOW (ref 3.5–5.3)
PROT SERPL-MCNC: 6.4 G/DL — SIGNIFICANT CHANGE UP (ref 6–8.3)
RAPID RVP RESULT: DETECTED
RBC # BLD: 3.84 M/UL — SIGNIFICANT CHANGE UP (ref 3.8–5.2)
RBC # FLD: 12.3 % — SIGNIFICANT CHANGE UP (ref 10.3–14.5)
SARS-COV-2 RNA SPEC QL NAA+PROBE: DETECTED
SODIUM SERPL-SCNC: 141 MMOL/L — SIGNIFICANT CHANGE UP (ref 135–145)
WBC # BLD: 9.15 K/UL — SIGNIFICANT CHANGE UP (ref 3.8–10.5)
WBC # FLD AUTO: 9.15 K/UL — SIGNIFICANT CHANGE UP (ref 3.8–10.5)

## 2021-05-25 RX ORDER — POTASSIUM CHLORIDE 20 MEQ
10 PACKET (EA) ORAL
Refills: 0 | Status: DISCONTINUED | OUTPATIENT
Start: 2021-05-25 | End: 2021-05-25

## 2021-05-25 RX ORDER — MONTELUKAST 4 MG/1
10 TABLET, CHEWABLE ORAL EVERY 24 HOURS
Refills: 0 | Status: DISCONTINUED | OUTPATIENT
Start: 2021-05-25 | End: 2021-06-01

## 2021-05-25 RX ORDER — PANTOPRAZOLE SODIUM 20 MG/1
40 TABLET, DELAYED RELEASE ORAL
Refills: 0 | Status: DISCONTINUED | OUTPATIENT
Start: 2021-05-25 | End: 2021-05-25

## 2021-05-25 RX ORDER — POTASSIUM CHLORIDE 20 MEQ
40 PACKET (EA) ORAL ONCE
Refills: 0 | Status: COMPLETED | OUTPATIENT
Start: 2021-05-25 | End: 2021-05-25

## 2021-05-25 RX ORDER — FAMOTIDINE 10 MG/ML
20 INJECTION INTRAVENOUS
Refills: 0 | Status: DISCONTINUED | OUTPATIENT
Start: 2021-05-25 | End: 2021-06-01

## 2021-05-25 RX ADMIN — FAMOTIDINE 20 MILLIGRAM(S): 10 INJECTION INTRAVENOUS at 17:25

## 2021-05-25 RX ADMIN — Medication 40 MILLIEQUIVALENT(S): at 08:55

## 2021-05-25 RX ADMIN — ENOXAPARIN SODIUM 40 MILLIGRAM(S): 100 INJECTION SUBCUTANEOUS at 11:37

## 2021-05-25 RX ADMIN — PANTOPRAZOLE SODIUM 40 MILLIGRAM(S): 20 TABLET, DELAYED RELEASE ORAL at 08:55

## 2021-05-25 RX ADMIN — Medication 5000 UNIT(S): at 11:37

## 2021-05-25 RX ADMIN — Medication 2000 MILLIGRAM(S): at 11:37

## 2021-05-25 RX ADMIN — Medication 6 MILLIGRAM(S): at 05:51

## 2021-05-25 RX ADMIN — ZINC SULFATE TAB 220 MG (50 MG ZINC EQUIVALENT) 220 MILLIGRAM(S): 220 (50 ZN) TAB at 11:37

## 2021-05-25 RX ADMIN — MONTELUKAST 10 MILLIGRAM(S): 4 TABLET, CHEWABLE ORAL at 17:25

## 2021-05-25 NOTE — CONSULT NOTE ADULT - ASSESSMENT
70 y/o female with no significant pmhx presents to ED with c/o body ache and difficulty breathing x 8 days,COVID pneumonia.  1.Pulm eval noted.  2.COVID-dexamethasone,vitc and d.  3.GI and DVT prophylaxis.  4.Echocardiogram as outpatient.

## 2021-05-25 NOTE — PROGRESS NOTE ADULT - PROBLEM SELECTOR PLAN 2
Elevated inflammatory markers like ESR, CRP, Ferritin.   Continue dexamethasone IV 6g daily x 10 days  will start remdesivir x 5 days pending approval, if antibody is negative, DC  c/w vit C, zinc, vit D per attending  Encourage prone positioning. Elevated inflammatory markers like ESR, CRP, Ferritin.   - Remdes 5/24-25 DC as COVID Ab positive  - Dexa 5/24 - 06/1  c/w vit C, zinc, vit D per attending  Encourage prone positioning.  - f/u RVP

## 2021-05-25 NOTE — PROGRESS NOTE ADULT - PROBLEM SELECTOR PLAN 3
IMPROVE VTE Individual Risk Assessment  RISK                                                                Points  [  ] Previous VTE                                                  3  [  ] Thrombophilia                                               2  [  ] Lower limb paralysis                                      2        (unable to hold up >15 seconds)    [  ] Current Cancer                                              2         (within 6 months)  [x  ] Immobilization > 24 hrs                                1  [  ] ICU/CCU stay > 24 hours                              1  [ x ] Age > 60                                                      1  IMPROVE VTE Score ___2______  Lovenox for DVT prophylaxis IMPROVE VTE Individual Risk Assessment  RISK                                                                Points  [  ] Previous VTE                                                  3  [  ] Thrombophilia                                               2  [  ] Lower limb paralysis                                      2        (unable to hold up >15 seconds)    [  ] Current Cancer                                              2         (within 6 months)  [x  ] Immobilization > 24 hrs                                1  [  ] ICU/CCU stay > 24 hours                              1  [ x ] Age > 60                                                      1  IMPROVE VTE Score ___2______  Lovenox for DVT prophylaxis 40 SC QD

## 2021-05-25 NOTE — PROGRESS NOTE ADULT - PROBLEM SELECTOR PLAN 1
Now on NC 5L spO2: 93%- 94%  likely due to covid PNA  Less likely PE in the setting of normal D dimer and no tachycardia  CXR showed b/l infiltrates left more than right.  c/w oxygen supplementation with spO2> 94%  monitor respiratory status  Will check inflammatory markers Now on NC 5L spO2: 93%- 94%  likely due to covid PNA  Less likely PE in the setting of normal D dimer and no tachycardia  CXR showed b/l infiltrates left more than right.  c/w oxygen supplementation with spO2> 94%  monitor respiratory status  trend inflammatory markers  - f/u RVP  - Remdes 5/24-25 DC as COVID Ab positive  - Dexa 5/24 - 06/1

## 2021-05-25 NOTE — PROGRESS NOTE ADULT - ASSESSMENT
72 y/o female with no significant pmhx presents to ED with c/o body ache and difficulty breathing x 8 days. Patient notes  is admitted to ICU in [place] for Covid. Pt was admitted for acute hypoxic respiratory failure due to COVID.     On arrival noted 80% on RA, now on NC3L 96%. Pt didn't take covid vaccine. taking no meds at home.

## 2021-05-25 NOTE — CONSULT NOTE ADULT - SUBJECTIVE AND OBJECTIVE BOX
Patient is a 71y old  Female who presents with a chief complaint of Acute hypoxic respiratory failure (25 May 2021 12:45)          REVIEW OF SYSTEMS: Total of twelve systems have been reviewed with patient and found to be negative unless mentioned in HPI      PAST MEDICAL & SURGICAL HISTORY:  No pertinent past medical history  No significant past surgical history      SOCIAL HISTORY  Alcohol: Does not drink  Tobacco: Does not smoke  Illicit substance use: None      FAMILY HISTORY: Non contributory to the present illness        ALLERGIES: No Known Allergies        Vital Signs Last 24 Hrs  T(C): 36.3 (25 May 2021 14:46), Max: 36.6 (25 May 2021 13:52)  T(F): 97.3 (25 May 2021 14:46), Max: 97.9 (25 May 2021 13:52)  HR: 76 (25 May 2021 14:46) (71 - 85)  BP: 121/54 (25 May 2021 14:46) (107/85 - 128/69)  BP(mean): 89 (24 May 2021 20:49) (89 - 89)  RR: 17 (25 May 2021 14:46) (17 - 18)  SpO2: 94% (25 May 2021 14:46) (90% - 100%)        PHYSICAL EXAM:  GENERAL: Not in distress   CHEST/LUNG: Not using accessory muscles  HEART: s1 and s2 present  ABDOMEN:  Nontender and  Nondistended  EXTREMITIES: No pedal  edema  CNS: Awake and Alert      LABS:                        11.6   9.15  )-----------( 702      ( 25 May 2021 06:31 )             34.7       05-25    141  |  109<H>  |  22<H>  ----------------------------<  98  3.3<L>   |  25  |  0.44<L>    Ca    8.5      25 May 2021 06:31  Phos  2.6     05-25  Mg     2.2     05-25    TPro  6.4  /  Alb  1.9<L>  /  TBili  0.4  /  DBili  x   /  AST  27  /  ALT  33  /  AlkPhos  70  05-25        MEDICATIONS  (STANDING):  ascorbic acid 2000 milliGRAM(s) Oral daily  cholecalciferol 5000 Unit(s) Oral daily  dexAMETHasone  Injectable 6 milliGRAM(s) IV Push daily  enoxaparin Injectable 40 milliGRAM(s) SubCutaneous daily  pantoprazole    Tablet 40 milliGRAM(s) Oral before breakfast  zinc sulfate 220 milliGRAM(s) Oral daily    MEDICATIONS  (PRN):  acetaminophen   Tablet .. 650 milliGRAM(s) Oral every 6 hours PRN Temp greater or equal to 38C (100.4F), Mild Pain (1 - 3)        RADIOLOGY & ADDITIONAL TESTS:    5/23/21 : Xray Chest 1 View- PORTABLE-Urgent (05.23.21 @ 15:08) : Bilateral infiltrates left greater than right.      MICROBIOLOGY DATA:    Respiratory Viral Panel with COVID-19 by KATLYN (05.25.21 @ 09:52)   Rapid RVP Result: Detected   SARS-CoV-2: Detected    COVID-19 Yossi Domain Antibody (05.24.21 @ 22:04)   COVID-19 Yossi Domain Antibody Result: 222.00    Culture - Blood (05.23.21 @ 18:46)   Specimen Source: .Blood Blood-Peripheral   Culture Results: No growth to date.     Culture - Blood (05.23.21 @ 18:46)   Specimen Source: .Blood Blood-Peripheral   Culture Results: No growth to date.     COVID-19 PCR . (05.23.21 @ 15:27)   COVID-19 PCR: Detected             Patient is a 71y old  Female with no significant pmhx presents to the ER for evaluation of body ache, fever and difficulty breathing x 8 days. Her  is admitted to ICU in [place] for COVID. ON arrival she found to have hypoxia to 80 %, tachycardia and tachypnea. The CXR shows Bilateral infiltrates left greater than right. She has started on Remdesivir and Dexamethasone, but after 3 doses  Remdesivir ha sbeen stopped due to positive Antibody. Currently she is on Dexamethasone and Anticoagulation. The ID consult requested to assist with further management of COVID.         REVIEW OF SYSTEMS: Total of twelve systems have been reviewed with patient and found to be negative unless mentioned in HPI      PAST MEDICAL & SURGICAL HISTORY:  No pertinent past medical history  No significant past surgical history      SOCIAL HISTORY  Alcohol: Does not drink  Tobacco: Does not smoke  Illicit substance use: None      FAMILY HISTORY: Non contributory to the present illness        ALLERGIES: No Known Allergies        Vital Signs Last 24 Hrs  T(C): 36.3 (25 May 2021 14:46), Max: 36.6 (25 May 2021 13:52)  T(F): 97.3 (25 May 2021 14:46), Max: 97.9 (25 May 2021 13:52)  HR: 76 (25 May 2021 14:46) (71 - 85)  BP: 121/54 (25 May 2021 14:46) (107/85 - 128/69)  BP(mean): 89 (24 May 2021 20:49) (89 - 89)  RR: 17 (25 May 2021 14:46) (17 - 18)  SpO2: 94% (25 May 2021 14:46) (90% - 100%)        PHYSICAL EXAM:  GENERAL: Not in distress, on oxygen via NC  CHEST/LUNG: Not using accessory muscles  HEART: s1 and s2 present  ABDOMEN:  Nontender and  Nondistended  EXTREMITIES: No pedal  edema  CNS: Awake and Alert      LABS:                        11.6   9.15  )-----------( 702      ( 25 May 2021 06:31 )             34.7       05-25    141  |  109<H>  |  22<H>  ----------------------------<  98  3.3<L>   |  25  |  0.44<L>    Ca    8.5      25 May 2021 06:31  Phos  2.6     05-25  Mg     2.2     05-25    TPro  6.4  /  Alb  1.9<L>  /  TBili  0.4  /  DBili  x   /  AST  27  /  ALT  33  /  AlkPhos  70  05-25        MEDICATIONS  (STANDING):  ascorbic acid 2000 milliGRAM(s) Oral daily  cholecalciferol 5000 Unit(s) Oral daily  dexAMETHasone  Injectable 6 milliGRAM(s) IV Push daily  enoxaparin Injectable 40 milliGRAM(s) SubCutaneous daily  pantoprazole    Tablet 40 milliGRAM(s) Oral before breakfast  zinc sulfate 220 milliGRAM(s) Oral daily    MEDICATIONS  (PRN):  acetaminophen   Tablet .. 650 milliGRAM(s) Oral every 6 hours PRN Temp greater or equal to 38C (100.4F), Mild Pain (1 - 3)        RADIOLOGY & ADDITIONAL TESTS:    5/23/21 : Xray Chest 1 View- PORTABLE-Urgent (05.23.21 @ 15:08) : Bilateral infiltrates left greater than right.      MICROBIOLOGY DATA:    Respiratory Viral Panel with COVID-19 by KATLYN (05.25.21 @ 09:52)   Rapid RVP Result: Detected   SARS-CoV-2: Detected    COVID-19 Yossi Domain Antibody (05.24.21 @ 22:04)   COVID-19 Yossi Domain Antibody Result: 222.00    Culture - Blood (05.23.21 @ 18:46)   Specimen Source: .Blood Blood-Peripheral   Culture Results: No growth to date.     Culture - Blood (05.23.21 @ 18:46)   Specimen Source: .Blood Blood-Peripheral   Culture Results: No growth to date.     COVID-19 PCR . (05.23.21 @ 15:27)   COVID-19 PCR: Detected

## 2021-05-25 NOTE — PROGRESS NOTE ADULT - SUBJECTIVE AND OBJECTIVE BOX
PGY-1 Progress Note discussed with attending    PAGER #: [[phone]] TILL 5:00 PM  PLEASE CONTACT ON CALL TEAM:   - On Call Team (Please refer to Shanda) FROM 5:00 PM - 8:30PM  - Nightfloat Team FROM 8:30 -7:30 AM    CHIEF COMPLAINT & BRIEF HOSPITAL COURSE:      INTERVAL HPI/OVERNIGHT EVENTS:       REVIEW OF SYSTEMS:  CONSTITUTIONAL: No fever, weight loss, or fatigue  RESPIRATORY: No cough, wheezing, chills or hemoptysis; No shortness of breath  CARDIOVASCULAR: No chest pain, palpitations, dizziness, or leg swelling  GASTROINTESTINAL: No abdominal pain. No nausea, vomiting, or hematemesis; No diarrhea or constipation. No melena or hematochezia.  GENITOURINARY: No dysuria or hematuria, urinary frequency  NEUROLOGICAL: No headaches, memory loss, loss of strength, numbness, or tremors  SKIN: No itching, burning, rashes, or lesions     MEDICATIONS  (STANDING):  ascorbic acid 2000 milliGRAM(s) Oral daily  cholecalciferol 5000 Unit(s) Oral daily  dexAMETHasone  Injectable 6 milliGRAM(s) IV Push daily  enoxaparin Injectable 40 milliGRAM(s) SubCutaneous daily  pantoprazole    Tablet 40 milliGRAM(s) Oral before breakfast  potassium chloride   Powder 40 milliEquivalent(s) Oral once  zinc sulfate 220 milliGRAM(s) Oral daily    MEDICATIONS  (PRN):  acetaminophen   Tablet .. 650 milliGRAM(s) Oral every 6 hours PRN Temp greater or equal to 38C (100.4F), Mild Pain (1 - 3)      Vital Signs Last 24 Hrs  T(C): 36.1 (25 May 2021 05:26), Max: 36.3 (24 May 2021 14:56)  T(F): 97 (25 May 2021 05:26), Max: 97.3 (24 May 2021 14:56)  HR: 72 (25 May 2021 07:38) (71 - 75)  BP: 115/40 (25 May 2021 05:26) (107/85 - 126/36)  BP(mean): 89 (24 May 2021 20:49) (89 - 89)  RR: 18 (25 May 2021 07:38) (17 - 18)  SpO2: 94% (25 May 2021 07:38) (90% - 94%)    PHYSICAL EXAMINATION:  GENERAL: NAD, well built  HEAD:  Atraumatic, Normocephalic  EYES:  conjunctiva and sclera clear  NECK: Supple, No JVD, Normal thyroid  CHEST/LUNG: Clear to auscultation. Clear to percussion bilaterally; No rales, rhonchi, wheezing, or rubs  HEART: Regular rate and rhythm; No murmurs, rubs, or gallops  ABDOMEN: Soft, Nontender, Nondistended; Bowel sounds present  NERVOUS SYSTEM:  Alert & Oriented X3,    EXTREMITIES:  2+ Peripheral Pulses, No clubbing, cyanosis, or edema  SKIN: warm dry                          11.6   9.15  )-----------( 702      ( 25 May 2021 06:31 )             34.7     05-25    141  |  109<H>  |  22<H>  ----------------------------<  98  3.3<L>   |  25  |  0.44<L>    Ca    8.5      25 May 2021 06:31  Phos  2.6     05-25  Mg     2.2     05-25    TPro  6.4  /  Alb  1.9<L>  /  TBili  0.4  /  DBili  x   /  AST  27  /  ALT  33  /  AlkPhos  70  05-25    LIVER FUNCTIONS - ( 25 May 2021 06:31 )  Alb: 1.9 g/dL / Pro: 6.4 g/dL / ALK PHOS: 70 U/L / ALT: 33 U/L DA / AST: 27 U/L / GGT: x           CARDIAC MARKERS ( 24 May 2021 11:10 )  <0.015 ng/mL / x     / x     / x     / x      CARDIAC MARKERS ( 23 May 2021 15:27 )  <0.015 ng/mL / x     / x     / x     / x          PT/INR - ( 23 May 2021 15:27 )   PT: 14.3 sec;   INR: 1.21 ratio         PTT - ( 23 May 2021 15:27 )  PTT:28.1 sec      Culture - Blood (collected 23 May 2021 18:46)  Source: .Blood Blood-Peripheral  Preliminary Report (24 May 2021 19:01):    No growth to date.    Culture - Blood (collected 23 May 2021 18:46)  Source: .Blood Blood-Peripheral  Preliminary Report (24 May 2021 19:01):    No growth to date.        I&O's Summary    25 May 2021 07:01  -  25 May 2021 08:44  --------------------------------------------------------  IN: 0 mL / OUT: 300 mL / NET: -300 mL        CAPILLARY BLOOD GLUCOSE        CAPILLARY BLOOD GLUCOSE          RADIOLOGY & ADDITIONAL TESTS:                   PGY-1 Progress Note discussed with attending    PAGER #: [[phone]] TILL 5:00 PM  PLEASE CONTACT ON CALL TEAM:   - On Call Team (Please refer to Shanda) FROM 5:00 PM - 8:30PM  - Nightfloat Team FROM 8:30 -7:30 AM    CHIEF COMPLAINT & BRIEF HOSPITAL COURSE:  70 y/o female with no significant pmhx presents to ED with c/o body ache and difficulty breathing x 8 days. Patient notes  is admitted to ICU in [place] for Covid. Pt was admitted for acute hypoxic respiratory failure due to COVID.   On arrival noted 80% on RA, now on NC3L 96%. Pt didn't take covid vaccine. taking no meds at home. COVID PCR + positive. Admitted for AHRF 2/2 COVID PNA. CXR showed b/l infiltrates L>>R. Started on Dexamethason and Remdesivir on 5/24. COVID Ab resulted high positive, Remdesivir was discontinued on 5/25. BCx NGTD.    INTERVAL HPI/OVERNIGHT EVENTS:   No events overnight. Afebrile. Saturating well on 5L NC. Sent RVP ad has high Ab.    REVIEW OF SYSTEMS:  CONSTITUTIONAL: No fever, weight loss, + fatigue + myalgias  RESPIRATORY: + cough, wheezing, chills or hemoptysis; + shortness of breath  CARDIOVASCULAR: No chest pain, palpitations, dizziness, or leg swelling  GASTROINTESTINAL: No abdominal pain. No nausea, vomiting, or hematemesis; No diarrhea or constipation. No melena or hematochezia.  GENITOURINARY: No dysuria or hematuria, urinary frequency  NEUROLOGICAL: No headaches, memory loss, loss of strength, numbness, or tremors  SKIN: No itching, burning, rashes, or lesions     MEDICATIONS  (STANDING):  ascorbic acid 2000 milliGRAM(s) Oral daily  cholecalciferol 5000 Unit(s) Oral daily  dexAMETHasone  Injectable 6 milliGRAM(s) IV Push daily  enoxaparin Injectable 40 milliGRAM(s) SubCutaneous daily  pantoprazole    Tablet 40 milliGRAM(s) Oral before breakfast  potassium chloride   Powder 40 milliEquivalent(s) Oral once  zinc sulfate 220 milliGRAM(s) Oral daily    MEDICATIONS  (PRN):  acetaminophen   Tablet .. 650 milliGRAM(s) Oral every 6 hours PRN Temp greater or equal to 38C (100.4F), Mild Pain (1 - 3)      Vital Signs Last 24 Hrs  T(C): 36.1 (25 May 2021 05:26), Max: 36.3 (24 May 2021 14:56)  T(F): 97 (25 May 2021 05:26), Max: 97.3 (24 May 2021 14:56)  HR: 72 (25 May 2021 07:38) (71 - 75)  BP: 115/40 (25 May 2021 05:26) (107/85 - 126/36)  BP(mean): 89 (24 May 2021 20:49) (89 - 89)  RR: 18 (25 May 2021 07:38) (17 - 18)  SpO2: 94% (25 May 2021 07:38) (90% - 94%)    PHYSICAL EXAMINATION:  GENERAL: NAD, well built, 5L NC, no accessory muscle use  HEAD:  Atraumatic, Normocephalic  EYES:  conjunctiva and sclera clear  NECK: Supple, No JVD,   CHEST/LUNG: coarse breath sounds bilaterally. No rales, rhonchi, wheezing, or rubs  HEART: Regular rate and rhythm; No murmurs, rubs, or gallops  ABDOMEN: Soft, Nontender, Nondistended; Bowel sounds present  NERVOUS SYSTEM:  Alert & Oriented X3,    EXTREMITIES:  2+ Peripheral Pulses, No clubbing, cyanosis, or edema  SKIN: warm dry                          11.6   9.15  )-----------( 702      ( 25 May 2021 06:31 )             34.7     05-25    141  |  109<H>  |  22<H>  ----------------------------<  98  3.3<L>   |  25  |  0.44<L>    Ca    8.5      25 May 2021 06:31  Phos  2.6     05-25  Mg     2.2     05-25    TPro  6.4  /  Alb  1.9<L>  /  TBili  0.4  /  DBili  x   /  AST  27  /  ALT  33  /  AlkPhos  70  05-25    LIVER FUNCTIONS - ( 25 May 2021 06:31 )  Alb: 1.9 g/dL / Pro: 6.4 g/dL / ALK PHOS: 70 U/L / ALT: 33 U/L DA / AST: 27 U/L / GGT: x           CARDIAC MARKERS ( 24 May 2021 11:10 )  <0.015 ng/mL / x     / x     / x     / x      CARDIAC MARKERS ( 23 May 2021 15:27 )  <0.015 ng/mL / x     / x     / x     / x          PT/INR - ( 23 May 2021 15:27 )   PT: 14.3 sec;   INR: 1.21 ratio         PTT - ( 23 May 2021 15:27 )  PTT:28.1 sec      Culture - Blood (collected 23 May 2021 18:46)  Source: .Blood Blood-Peripheral  Preliminary Report (24 May 2021 19:01):    No growth to date.    Culture - Blood (collected 23 May 2021 18:46)  Source: .Blood Blood-Peripheral  Preliminary Report (24 May 2021 19:01):    No growth to date.        I&O's Summary    25 May 2021 07:01  -  25 May 2021 08:44  --------------------------------------------------------  IN: 0 mL / OUT: 300 mL / NET: -300 mL        RADIOLOGY & ADDITIONAL TESTS:    < from: Xray Chest 1 View- PORTABLE-Urgent (05.23.21 @ 15:08) >    EXAM:  XR CHEST PORTABLE URGENT 1V                            PROCEDURE DATE:  05/23/2021          INTERPRETATION:  AP semierect chest on May 23, 2021 at 3:08 PM. Patient is short of breath with cough and fever.    COMPARISON: None available.    Heart magnified by technique.    Mid lower lung field infiltrates left greater than right are noted.    Covid virus is not excluded.    IMPRESSION: Bilateral infiltrates left greater than right.            ELISABET BRENNER M.D., ATTENDING RADIOLOGIST  Thisdocument has been electronically signed. May 24 2021 10:50AM    < end of copied text >

## 2021-05-25 NOTE — CONSULT NOTE ADULT - ASSESSMENT
# Positive COVID     Would recommend:    1. Please change Protonix to Famotidine   2. Add Montelukast and continue VIT-D and C  3. Continue  Dexamethasone  4. COVID precaution    dr. Sánchez    will follow the patient with you and make further recommendation based on the clinical course and Lab results  Thank you for the opportunity to participate in Ms. BARRY's care      Attending Attestation:    Spent more than 65 minutes on total encounter, more than 50 % of the visit was spent counseling and/or coordinating care by the Attending physician.       Patient is a 71y old  Female with no significant pmhx presents to the ER for evaluation of body ache, fever and difficulty breathing x 8 days. Her  is admitted to ICU in [place] for COVID. ON arrival she found to have hypoxia to 80 %, tachycardia and tachypnea. The CXR shows Bilateral infiltrates left greater than right. She has started on Remdesivir and Dexamethasone, but after 3 doses  Remdesivir ha sbeen stopped due to positive Antibody. Currently she is on Dexamethasone and Anticoagulation. The ID consult requested to assist with further management of COVID.     # COVID pneumonia  # Acute Respiratory failure- on oxygen via NC  # COVID Ab positive- 5/24/21    Would recommend:    1. Please change Protonix to Famotidine   2. Add Montelukast and continue VIT-D and C  3. Continue  Dexamethasone  4. wean off oxygen as tolerated   5. COVID precaution    dr. Sánchez and patient     will follow the patient with you and make further recommendation based on the clinical course and Lab results  Thank you for the opportunity to participate in Ms. BARRY's care      Attending Attestation:    Spent more than 65 minutes on total encounter, more than 50 % of the visit was spent counseling and/or coordinating care by the Attending physician.

## 2021-05-25 NOTE — CONSULT NOTE ADULT - SUBJECTIVE AND OBJECTIVE BOX
CHIEF COMPLAINT:Patient is a 71y old  Female who presents with a chief complaint of Acute hypoxic respiratory failure.      HPI:  72 y/o female with no significant pmhx presents to ED with c/o body ache and difficulty breathing x 8 days. Patient notes  is admitted to ICU in [place] for Covid. Patient endorses she has leg pain and fever (100.4F),  denies nausea, vomiting, chest pain, calf swelling, diarrhea and abdominal pain. No other known complaints. NKDA.   On arrival noted 80% on RA, now on NC3L 96%. Pt didn't take covid vaccine. taking no meds at home.    [facility]: FULL CODE   (23 May 2021 19:38)      PAST MEDICAL & SURGICAL HISTORY:  No pertinent past medical history      No significant past surgical history            MEDICATIONS  (STANDING):  ascorbic acid 2000 milliGRAM(s) Oral daily  cholecalciferol 5000 Unit(s) Oral daily  dexAMETHasone  Injectable 6 milliGRAM(s) IV Push daily  enoxaparin Injectable 40 milliGRAM(s) SubCutaneous daily  pantoprazole    Tablet 40 milliGRAM(s) Oral before breakfast  zinc sulfate 220 milliGRAM(s) Oral daily    MEDICATIONS  (PRN):  acetaminophen   Tablet .. 650 milliGRAM(s) Oral every 6 hours PRN Temp greater or equal to 38C (100.4F), Mild Pain (1 - 3)      FAMILY HISTORY:No hx of CAD      SOCIAL HISTORY:    [ x] Non-smoker    [x ] Alcohol-denies    Allergies    No Known Allergies    Intolerances    	    REVIEW OF SYSTEMS:  CONSTITUTIONAL: No fever, weight loss, or fatigue  EYES: No eye pain, visual disturbances, or discharge  ENT:  No difficulty hearing, tinnitus, vertigo; No sinus or throat pain  NECK: No pain or stiffness  RESPIRATORY: No cough, wheezing, chills or hemoptysis; + Shortness of Breath  CARDIOVASCULAR: No chest pain, palpitations, passing out, dizziness, or leg swelling  GASTROINTESTINAL: No abdominal or epigastric pain. No nausea, vomiting, or hematemesis; No diarrhea or constipation. No melena or hematochezia.  GENITOURINARY: No dysuria, frequency, hematuria, or incontinence  NEUROLOGICAL: No headaches, memory loss, loss of strength, numbness, or tremors  SKIN: No itching, burning, rashes, or lesions   LYMPH Nodes: No enlarged glands  ENDOCRINE: No heat or cold intolerance; No hair loss  MUSCULOSKELETAL: No joint pain or swelling; No muscle, back, or extremity pain  PSYCHIATRIC: No depression, anxiety, mood swings, or difficulty sleeping  HEME/LYMPH: No easy bruising, or bleeding gums  ALLERGY AND IMMUNOLOGIC: No hives or eczema	        PHYSICAL EXAM:  T(C): 36.1 (05-25-21 @ 05:26), Max: 36.3 (05-24-21 @ 14:56)  HR: 72 (05-25-21 @ 07:38) (71 - 75)  BP: 115/40 (05-25-21 @ 05:26) (107/85 - 126/36)  RR: 18 (05-25-21 @ 07:38) (17 - 18)  SpO2: 94% (05-25-21 @ 07:38) (90% - 94%)  Wt(kg): --  I&O's Summary    25 May 2021 07:01  -  25 May 2021 12:47  --------------------------------------------------------  IN: 0 mL / OUT: 300 mL / NET: -300 mL        Appearance: Normal	  HEENT:   Normal oral mucosa, PERRL, EOMI	  Lymphatic: No lymphadenopathy  Cardiovascular: Normal S1 S2, No JVD, No murmurs, No edema  Respiratory: B/L ronchi  Psychiatry: A & O x 3, Mood & affect appropriate  Gastrointestinal:  Soft, Non-tender, + BS	  Skin: No rashes, No ecchymoses, No cyanosis	  Neurologic: Non-focal  Extremities: Normal range of motion, No clubbing, cyanosis or edema  Vascular: Peripheral pulses palpable 2+ bilaterally        ECG:  	nsr,nl axis  	  	  LABS:	 	    CARDIAC MARKERS:  CARDIAC MARKERS ( 24 May 2021 11:10 )  <0.015 ng/mL / x     / x     / x     / x      CARDIAC MARKERS ( 23 May 2021 15:27 )  <0.015 ng/mL / x     / x     / x     / x                                  11.6   9.15  )-----------( 702      ( 25 May 2021 06:31 )             34.7     05-25    141  |  109<H>  |  22<H>  ----------------------------<  98  3.3<L>   |  25  |  0.44<L>    Ca    8.5      25 May 2021 06:31  Phos  2.6     05-25  Mg     2.2     05-25    TPro  6.4  /  Alb  1.9<L>  /  TBili  0.4  /  DBili  x   /  AST  27  /  ALT  33  /  AlkPhos  70  05-25    r< from: Xray Chest 1 View- PORTABLE-Urgent (05.23.21 @ 15:08) >  EXAM:  XR CHEST PORTABLE URGENT 1V                            PROCEDURE DATE:  05/23/2021          INTERPRETATION:  AP semierect chest on May 23, 2021 at 3:08 PM. Patient is short of breath with cough and fever.    COMPARISON: None available.    Heart magnified by technique.    Mid lower lung field infiltrates left greater than right are noted.    Covid virus is not excluded.    IMPRESSION: Bilateral infiltrates left greater than right.    < end of copied text >

## 2021-05-25 NOTE — PROGRESS NOTE ADULT - SUBJECTIVE AND OBJECTIVE BOX
Time of Visit:  Patient seen and examined.     MEDICATIONS  (STANDING):  ascorbic acid 2000 milliGRAM(s) Oral daily  cholecalciferol 5000 Unit(s) Oral daily  dexAMETHasone  Injectable 6 milliGRAM(s) IV Push daily  enoxaparin Injectable 40 milliGRAM(s) SubCutaneous daily  famotidine    Tablet 20 milliGRAM(s) Oral two times a day  montelukast 10 milliGRAM(s) Oral every 24 hours  zinc sulfate 220 milliGRAM(s) Oral daily      MEDICATIONS  (PRN):  acetaminophen   Tablet .. 650 milliGRAM(s) Oral every 6 hours PRN Temp greater or equal to 38C (100.4F), Mild Pain (1 - 3)       Medications up to date at time of exam.      PHYSICAL EXAMINATION:  Patient has no new complaints.  GENERAL: The patient is a well-developed, well-nourished, in no apparent distress.     Vital Signs Last 24 Hrs  T(C): 36.3 (25 May 2021 14:46), Max: 36.6 (25 May 2021 13:52)  T(F): 97.3 (25 May 2021 14:46), Max: 97.9 (25 May 2021 13:52)  HR: 76 (25 May 2021 14:46) (71 - 85)  BP: 121/54 (25 May 2021 14:46) (107/85 - 128/69)  BP(mean): 89 (24 May 2021 20:49) (89 - 89)  RR: 18 (25 May 2021 17:58) (17 - 18)  SpO2: 93% (25 May 2021 17:58) (88% - 100%)   (if applicable)    Chest Tube (if applicable)    HEENT: Head is normocephalic and atraumatic. Extraocular muscles are intact. Mucous membranes are moist.     NECK: Supple, no palpable adenopathy.    LUNGS: Clear to auscultation, no wheezing, rales, or rhonchi.    HEART: Regular rate and rhythm without murmur.    ABDOMEN: Soft, nontender, and nondistended.  No hepatosplenomegaly is noted.    : No painful voiding, no pelvic pain    EXTREMITIES: Without any cyanosis, clubbing, rash, lesions or edema.    NEUROLOGIC: Awake, alert, oriented, grossly intact    SKIN: Warm, dry, good turgor.      LABS:                        11.6   9.15  )-----------( 702      ( 25 May 2021 06:31 )             34.7     05-25    141  |  109<H>  |  22<H>  ----------------------------<  98  3.3<L>   |  25  |  0.44<L>    Ca    8.5      25 May 2021 06:31  Phos  2.6     05-25  Mg     2.2     05-25    TPro  6.4  /  Alb  1.9<L>  /  TBili  0.4  /  DBili  x   /  AST  27  /  ALT  33  /  AlkPhos  70  05-25          CARDIAC MARKERS ( 24 May 2021 11:10 )  <0.015 ng/mL / x     / x     / x     / x          D-Dimer Assay, Quantitative: 492 ng/mL DDU (05-25-21 @ 06:18)    Serum Pro-Brain Natriuretic Peptide: 148 pg/mL (05-23-21 @ 15:27)      Procalcitonin, Serum: 0.10 ng/mL (05-24-21 @ 22:01)  Procalcitonin, Serum: 0.10 ng/mL (05-23-21 @ 23:27)      MICROBIOLOGY: (if applicable)    RADIOLOGY & ADDITIONAL STUDIES:  EKG:   CXR:  < from: Xray Chest 1 View- PORTABLE-Urgent (05.23.21 @ 15:08) >    EXAM:  XR CHEST PORTABLE URGENT 1V                            PROCEDURE DATE:  05/23/2021          INTERPRETATION:  AP semierect chest on May 23, 2021 at 3:08 PM. Patient is short of breath with cough and fever.    COMPARISON: None available.    Heart magnified by technique.    Mid lower lung field infiltrates left greater than right are noted.    Covid virus is not excluded.    IMPRESSION: Bilateral infiltrates left greater than right.            ELISABET BRENNER M.D., ATTENDING RADIOLOGIST  Thisdocument has been electronically signed. May 24 2021 10:50AM    < end of copied text >  ECHO:    IMPRESSION: 71y Female PAST MEDICAL & SURGICAL HISTORY:  No pertinent past medical history    No pertinent past medical history    No significant past surgical history    No pertinent past surgical history     p/w          Impression: This is a  71 yr old  woman  presented to ED with body ache and difficulty breathing x 8 days. Patient notes  is admitted to ICU in [place] for Covid.  With O2 saturation of 80% in ED with episode f Difficulty due to Acute Hypoxic Respiratory Failure secondary to  Pneumonia from covid-19 infection    [place]  -continue Remdesivir  x 5 days  -continue decadron 6 mg/day x 10 days  -continue O2 supp as needed to maintain sat >90%  -monitor biomarkers TIW  -f/u cultures  -isolation : contact and air borne   -dvt/gi prophy

## 2021-05-26 LAB
ALBUMIN SERPL ELPH-MCNC: 2 G/DL — LOW (ref 3.5–5)
ALP SERPL-CCNC: 65 U/L — SIGNIFICANT CHANGE UP (ref 40–120)
ALT FLD-CCNC: 31 U/L DA — SIGNIFICANT CHANGE UP (ref 10–60)
ANION GAP SERPL CALC-SCNC: 5 MMOL/L — SIGNIFICANT CHANGE UP (ref 5–17)
AST SERPL-CCNC: 25 U/L — SIGNIFICANT CHANGE UP (ref 10–40)
BILIRUB SERPL-MCNC: 0.5 MG/DL — SIGNIFICANT CHANGE UP (ref 0.2–1.2)
BUN SERPL-MCNC: 19 MG/DL — HIGH (ref 7–18)
CALCIUM SERPL-MCNC: 8.6 MG/DL — SIGNIFICANT CHANGE UP (ref 8.4–10.5)
CHLORIDE SERPL-SCNC: 110 MMOL/L — HIGH (ref 96–108)
CO2 SERPL-SCNC: 25 MMOL/L — SIGNIFICANT CHANGE UP (ref 22–31)
CREAT SERPL-MCNC: 0.48 MG/DL — LOW (ref 0.5–1.3)
D DIMER BLD IA.RAPID-MCNC: 504 NG/ML DDU — HIGH
FERRITIN SERPL-MCNC: 426 NG/ML — HIGH (ref 15–150)
GLUCOSE SERPL-MCNC: 105 MG/DL — HIGH (ref 70–99)
HCT VFR BLD CALC: 35.1 % — SIGNIFICANT CHANGE UP (ref 34.5–45)
HGB BLD-MCNC: 11.8 G/DL — SIGNIFICANT CHANGE UP (ref 11.5–15.5)
LDH SERPL L TO P-CCNC: 260 U/L — HIGH (ref 120–225)
MAGNESIUM SERPL-MCNC: 1.9 MG/DL — SIGNIFICANT CHANGE UP (ref 1.6–2.6)
MCHC RBC-ENTMCNC: 30.3 PG — SIGNIFICANT CHANGE UP (ref 27–34)
MCHC RBC-ENTMCNC: 33.6 GM/DL — SIGNIFICANT CHANGE UP (ref 32–36)
MCV RBC AUTO: 90 FL — SIGNIFICANT CHANGE UP (ref 80–100)
NRBC # BLD: 0 /100 WBCS — SIGNIFICANT CHANGE UP (ref 0–0)
PHOSPHATE SERPL-MCNC: 2.6 MG/DL — SIGNIFICANT CHANGE UP (ref 2.5–4.5)
PLATELET # BLD AUTO: 728 K/UL — HIGH (ref 150–400)
POTASSIUM SERPL-MCNC: 4.1 MMOL/L — SIGNIFICANT CHANGE UP (ref 3.5–5.3)
POTASSIUM SERPL-SCNC: 4.1 MMOL/L — SIGNIFICANT CHANGE UP (ref 3.5–5.3)
PROT SERPL-MCNC: 6.3 G/DL — SIGNIFICANT CHANGE UP (ref 6–8.3)
RBC # BLD: 3.9 M/UL — SIGNIFICANT CHANGE UP (ref 3.8–5.2)
RBC # FLD: 12.6 % — SIGNIFICANT CHANGE UP (ref 10.3–14.5)
SODIUM SERPL-SCNC: 140 MMOL/L — SIGNIFICANT CHANGE UP (ref 135–145)
WBC # BLD: 12.75 K/UL — HIGH (ref 3.8–10.5)
WBC # FLD AUTO: 12.75 K/UL — HIGH (ref 3.8–10.5)

## 2021-05-26 RX ADMIN — Medication 650 MILLIGRAM(S): at 20:11

## 2021-05-26 RX ADMIN — Medication 6 MILLIGRAM(S): at 06:19

## 2021-05-26 RX ADMIN — FAMOTIDINE 20 MILLIGRAM(S): 10 INJECTION INTRAVENOUS at 06:19

## 2021-05-26 RX ADMIN — ENOXAPARIN SODIUM 40 MILLIGRAM(S): 100 INJECTION SUBCUTANEOUS at 11:19

## 2021-05-26 RX ADMIN — Medication 5000 UNIT(S): at 11:19

## 2021-05-26 RX ADMIN — Medication 2000 MILLIGRAM(S): at 11:19

## 2021-05-26 RX ADMIN — ZINC SULFATE TAB 220 MG (50 MG ZINC EQUIVALENT) 220 MILLIGRAM(S): 220 (50 ZN) TAB at 11:19

## 2021-05-26 RX ADMIN — FAMOTIDINE 20 MILLIGRAM(S): 10 INJECTION INTRAVENOUS at 17:12

## 2021-05-26 RX ADMIN — MONTELUKAST 10 MILLIGRAM(S): 4 TABLET, CHEWABLE ORAL at 17:12

## 2021-05-26 NOTE — PROGRESS NOTE ADULT - SUBJECTIVE AND OBJECTIVE BOX
PGY-1 Progress Note discussed with attending    PAGER #: [[phone]] TILL 5:00 PM  PLEASE CONTACT ON CALL TEAM:   - On Call Team (Please refer to Shanda) FROM 5:00 PM - 8:30PM  - Nightfloat Team FROM 8:30 -7:30 AM    CHIEF COMPLAINT & BRIEF HOSPITAL COURSE:      INTERVAL HPI/OVERNIGHT EVENTS:       REVIEW OF SYSTEMS:  CONSTITUTIONAL: No fever, weight loss, or fatigue  RESPIRATORY: No cough, wheezing, chills or hemoptysis; No shortness of breath  CARDIOVASCULAR: No chest pain, palpitations, dizziness, or leg swelling  GASTROINTESTINAL: No abdominal pain. No nausea, vomiting, or hematemesis; No diarrhea or constipation. No melena or hematochezia.  GENITOURINARY: No dysuria or hematuria, urinary frequency  NEUROLOGICAL: No headaches, memory loss, loss of strength, numbness, or tremors  SKIN: No itching, burning, rashes, or lesions     MEDICATIONS  (STANDING):  ascorbic acid 2000 milliGRAM(s) Oral daily  cholecalciferol 5000 Unit(s) Oral daily  dexAMETHasone  Injectable 6 milliGRAM(s) IV Push daily  enoxaparin Injectable 40 milliGRAM(s) SubCutaneous daily  famotidine    Tablet 20 milliGRAM(s) Oral two times a day  montelukast 10 milliGRAM(s) Oral every 24 hours  zinc sulfate 220 milliGRAM(s) Oral daily    MEDICATIONS  (PRN):  acetaminophen   Tablet .. 650 milliGRAM(s) Oral every 6 hours PRN Temp greater or equal to 38C (100.4F), Mild Pain (1 - 3)      Vital Signs Last 24 Hrs  T(C): 36.4 (26 May 2021 05:31), Max: 36.6 (25 May 2021 13:52)  T(F): 97.6 (26 May 2021 05:31), Max: 97.9 (25 May 2021 13:52)  HR: 75 (26 May 2021 05:31) (70 - 85)  BP: 124/40 (26 May 2021 05:31) (113/39 - 128/69)  BP(mean): --  RR: 17 (26 May 2021 05:31) (17 - 18)  SpO2: 96% (26 May 2021 05:31) (88% - 100%)    PHYSICAL EXAMINATION:  GENERAL: NAD, well built  HEAD:  Atraumatic, Normocephalic  EYES:  conjunctiva and sclera clear  NECK: Supple, No JVD, Normal thyroid  CHEST/LUNG: Clear to auscultation. Clear to percussion bilaterally; No rales, rhonchi, wheezing, or rubs  HEART: Regular rate and rhythm; No murmurs, rubs, or gallops  ABDOMEN: Soft, Nontender, Nondistended; Bowel sounds present  NERVOUS SYSTEM:  Alert & Oriented X3,    EXTREMITIES:  2+ Peripheral Pulses, No clubbing, cyanosis, or edema  SKIN: warm dry                          11.8   12.75 )-----------( 728      ( 26 May 2021 08:20 )             35.1     05-26    140  |  110<H>  |  19<H>  ----------------------------<  105<H>  4.1   |  25  |  0.48<L>    Ca    8.6      26 May 2021 08:20  Phos  2.6     05-26  Mg     1.9     05-26    TPro  6.3  /  Alb  2.0<L>  /  TBili  0.5  /  DBili  x   /  AST  25  /  ALT  31  /  AlkPhos  65  05-26    LIVER FUNCTIONS - ( 26 May 2021 08:20 )  Alb: 2.0 g/dL / Pro: 6.3 g/dL / ALK PHOS: 65 U/L / ALT: 31 U/L DA / AST: 25 U/L / GGT: x           CARDIAC MARKERS ( 24 May 2021 11:10 )  <0.015 ng/mL / x     / x     / x     / x                Culture - Blood (collected 23 May 2021 18:46)  Source: .Blood Blood-Peripheral  Preliminary Report (24 May 2021 19:01):    No growth to date.    Culture - Blood (collected 23 May 2021 18:46)  Source: .Blood Blood-Peripheral  Preliminary Report (24 May 2021 19:01):    No growth to date.        I&O's Summary    25 May 2021 07:01  -  26 May 2021 07:00  --------------------------------------------------------  IN: 0 mL / OUT: 300 mL / NET: -300 mL        CAPILLARY BLOOD GLUCOSE        CAPILLARY BLOOD GLUCOSE          RADIOLOGY & ADDITIONAL TESTS:                   PGY-1 Progress Note discussed with attending    PAGER #: [[phone]] TILL 5:00 PM  PLEASE CONTACT ON CALL TEAM:   - On Call Team (Please refer to Shanda) FROM 5:00 PM - 8:30PM  - Nightfloat Team FROM 8:30 -7:30 AM    CHIEF COMPLAINT & BRIEF HOSPITAL COURSE:  72 y/o female with no significant pmhx presents to ED with c/o body ache and difficulty breathing x 8 days. Patient notes  is admitted to ICU in [place] for Covid. Pt was admitted for acute hypoxic respiratory failure due to COVID.   On arrival noted 80% on RA, now on NC3L 96%. Pt didn't take covid vaccine. taking no meds at home. COVID PCR + positive. Admitted for AHRF 2/2 COVID PNA. CXR showed b/l infiltrates L>>R. Started on Dexamethason and Remdesivir on 5/24. COVID Ab resulted high positive, Remdesivir was discontinued on 5/25. BCx NGTD. RVP positive COVID. BCx NGTD.    INTERVAL HPI/OVERNIGHT EVENTS:   Saturating well on 5L NC, no new complaints. No fevers overnight.    REVIEW OF SYSTEMS:  CONSTITUTIONAL: No fever, weight loss, or fatigue  RESPIRATORY: + cough, No wheezing, chills or hemoptysis; + shortness of breath  CARDIOVASCULAR: No chest pain, palpitations, dizziness, or leg swelling  GASTROINTESTINAL: No abdominal pain. No nausea, vomiting, or hematemesis; No diarrhea or constipation. No melena or hematochezia.  GENITOURINARY: No dysuria or hematuria, urinary frequency  NEUROLOGICAL: No headaches, memory loss, loss of strength, numbness, or tremors  SKIN: No itching, burning, rashes, or lesions     MEDICATIONS  (STANDING):  ascorbic acid 2000 milliGRAM(s) Oral daily  cholecalciferol 5000 Unit(s) Oral daily  dexAMETHasone  Injectable 6 milliGRAM(s) IV Push daily  enoxaparin Injectable 40 milliGRAM(s) SubCutaneous daily  famotidine    Tablet 20 milliGRAM(s) Oral two times a day  montelukast 10 milliGRAM(s) Oral every 24 hours  zinc sulfate 220 milliGRAM(s) Oral daily    MEDICATIONS  (PRN):  acetaminophen   Tablet .. 650 milliGRAM(s) Oral every 6 hours PRN Temp greater or equal to 38C (100.4F), Mild Pain (1 - 3)    Vital Signs Last 24 Hrs  T(C): 36.4 (26 May 2021 05:31), Max: 36.6 (25 May 2021 13:52)  T(F): 97.6 (26 May 2021 05:31), Max: 97.9 (25 May 2021 13:52)  HR: 75 (26 May 2021 05:31) (70 - 85)  BP: 124/40 (26 May 2021 05:31) (113/39 - 128/69)  BP(mean): --  RR: 17 (26 May 2021 05:31) (17 - 18)  SpO2: 96% (26 May 2021 05:31) (88% - 100%)    PHYSICAL EXAMINATION:  GENERAL: NAD, well built, 5L NC  HEAD:  Atraumatic, Normocephalic  EYES:  conjunctiva and sclera clear  NECK: Supple, No JVD, Normal thyroid  CHEST/LUNG: coarse breath sounds throughout lung fields.  No rales, rhonchi, wheezing, or rubs  HEART: Regular rate and rhythm; No murmurs, rubs, or gallops  ABDOMEN: Soft, Nontender, Nondistended; Bowel sounds present  NERVOUS SYSTEM:  Alert & Oriented X3,    EXTREMITIES:  2+ Peripheral Pulses, No clubbing, cyanosis, or edema  SKIN: warm dry                        11.8   12.75 )-----------( 728      ( 26 May 2021 08:20 )             35.1     05-26    140  |  110<H>  |  19<H>  ----------------------------<  105<H>  4.1   |  25  |  0.48<L>    Ca    8.6      26 May 2021 08:20  Phos  2.6     05-26  Mg     1.9     05-26    TPro  6.3  /  Alb  2.0<L>  /  TBili  0.5  /  DBili  x   /  AST  25  /  ALT  31  /  AlkPhos  65  05-26    LIVER FUNCTIONS - ( 26 May 2021 08:20 )  Alb: 2.0 g/dL / Pro: 6.3 g/dL / ALK PHOS: 65 U/L / ALT: 31 U/L DA / AST: 25 U/L / GGT: x           CARDIAC MARKERS ( 24 May 2021 11:10 )  <0.015 ng/mL / x     / x     / x     / x        Culture - Blood (collected 23 May 2021 18:46)  Source: .Blood Blood-Peripheral  Preliminary Report (24 May 2021 19:01):    No growth to date.    Culture - Blood (collected 23 May 2021 18:46)  Source: .Blood Blood-Peripheral  Preliminary Report (24 May 2021 19:01):    No growth to date.    I&O's Summary    25 May 2021 07:01  -  26 May 2021 07:00  --------------------------------------------------------  IN: 0 mL / OUT: 300 mL / NET: -300 mL      RADIOLOGY & ADDITIONAL TESTS:    < from: Xray Chest 1 View- PORTABLE-Urgent (05.23.21 @ 15:08) >    EXAM:  XR CHEST PORTABLE URGENT 1V                        PROCEDURE DATE:  05/23/2021      INTERPRETATION:  AP semierect chest on May 23, 2021 at 3:08 PM. Patient is short of breath with cough and fever.    COMPARISON: None available.    Heart magnified by technique.    Mid lower lung field infiltrates left greater than right are noted.    Covid virus is not excluded.    IMPRESSION: Bilateral infiltrates left greater than right.      ELISABET BRENNER M.D., ATTENDING RADIOLOGIST  Thisdocument has been electronically signed. May 24 2021 10:50AM    < end of copied text >

## 2021-05-26 NOTE — PROGRESS NOTE ADULT - PROBLEM SELECTOR PLAN 2
Elevated inflammatory markers like ESR, CRP, Ferritin.   - Remdes 5/24-25 DC as COVID Ab positive  - Dexa 5/24 - 06/1  c/w vit C, zinc, vit D per attending  Encourage prone positioning.  - f/u RVP Elevated inflammatory markers like ESR, CRP, Ferritin.   - Remdes 5/24-25 DC as COVID Ab positive  - Dexa 5/24 - 06/1  c/w vit C, zinc, vit D per attending  Encourage prone positioning.  - RVP POSITIVE for COVID on repeat

## 2021-05-26 NOTE — PROGRESS NOTE ADULT - SUBJECTIVE AND OBJECTIVE BOX
Patient is seen and examined at the bed side, is afebrile.        REVIEW OF SYSTEMS: All other review systems are negative      ALLERGIES: No Known Allergies        Vital Signs Last 24 Hrs  T(C): 36.4 (26 May 2021 14:54), Max: 36.4 (25 May 2021 21:07)  T(F): 97.5 (26 May 2021 14:54), Max: 97.6 (25 May 2021 21:07)  HR: 74 (26 May 2021 14:54) (70 - 75)  BP: 124/44 (26 May 2021 14:54) (113/39 - 124/44)  BP(mean): --  RR: 16 (26 May 2021 14:54) (16 - 18)  SpO2: 96% (26 May 2021 14:54) (88% - 96%)      PHYSICAL EXAM:  GENERAL: Not in distress, on oxygen via NC  CHEST/LUNG: Not using accessory muscles  HEART: s1 and s2 present  ABDOMEN:  Nontender and  Nondistended  EXTREMITIES: No pedal  edema  CNS: Awake and Alert      LABS:                        11.8   12.75 )-----------( 728      ( 26 May 2021 08:20 )             35.1                           11.6   9.15  )-----------( 702      ( 25 May 2021 06:31 )             34.7       05-26    140  |  110<H>  |  19<H>  ----------------------------<  105<H>  4.1   |  25  |  0.48<L>    Ca    8.6      26 May 2021 08:20  Phos  2.6     05-26  Mg     1.9     05-26    TPro  6.3  /  Alb  2.0<L>  /  TBili  0.5  /  DBili  x   /  AST  25  /  ALT  31  /  AlkPhos  65  05-26    05-25    141  |  109<H>  |  22<H>  ----------------------------<  98  3.3<L>   |  25  |  0.44<L>    Ca    8.5      25 May 2021 06:31  Phos  2.6     05-25  Mg     2.2     05-25    TPro  6.4  /  Alb  1.9<L>  /  TBili  0.4  /  DBili  x   /  AST  27  /  ALT  33  /  AlkPhos  70  05-25        MEDICATIONS  (STANDING):    ascorbic acid 2000 milliGRAM(s) Oral daily  cholecalciferol 5000 Unit(s) Oral daily  dexAMETHasone  Injectable 6 milliGRAM(s) IV Push daily  enoxaparin Injectable 40 milliGRAM(s) SubCutaneous daily  famotidine    Tablet 20 milliGRAM(s) Oral two times a day  montelukast 10 milliGRAM(s) Oral every 24 hours  zinc sulfate 220 milliGRAM(s) Oral daily        RADIOLOGY & ADDITIONAL TESTS:    5/23/21 : Xray Chest 1 View- PORTABLE-Urgent (05.23.21 @ 15:08) : Bilateral infiltrates left greater than right.      MICROBIOLOGY DATA:    Respiratory Viral Panel with COVID-19 by KATLYN (05.25.21 @ 09:52)   Rapid RVP Result: Detected   SARS-CoV-2: Detected    COVID-19 Yossi Domain Antibody (05.24.21 @ 22:04)   COVID-19 Yossi Domain Antibody Result: 222.00    Culture - Blood (05.23.21 @ 18:46)   Specimen Source: .Blood Blood-Peripheral   Culture Results: No growth to date.     Culture - Blood (05.23.21 @ 18:46)   Specimen Source: .Blood Blood-Peripheral   Culture Results: No growth to date.     COVID-19 PCR . (05.23.21 @ 15:27)   COVID-19 PCR: Detected             Patient is seen and examined at the bed side, is afebrile. The oxygen requirement went up.      REVIEW OF SYSTEMS: All other review systems are negative      ALLERGIES: No Known Allergies      Vital Signs Last 24 Hrs  T(C): 36.4 (26 May 2021 14:54), Max: 36.4 (25 May 2021 21:07)  T(F): 97.5 (26 May 2021 14:54), Max: 97.6 (25 May 2021 21:07)  HR: 74 (26 May 2021 14:54) (70 - 75)  BP: 124/44 (26 May 2021 14:54) (113/39 - 124/44)  BP(mean): --  RR: 16 (26 May 2021 14:54) (16 - 18)  SpO2: 96% (26 May 2021 14:54) (88% - 96%)      PHYSICAL EXAM:  GENERAL: Not in distress, on oxygen via NC  CHEST/LUNG: Not using accessory muscles  HEART: s1 and s2 present  ABDOMEN:  Nontender and  Nondistended  EXTREMITIES: No pedal  edema  CNS: Awake and Alert      LABS:                        11.8   12.75 )-----------( 728      ( 26 May 2021 08:20 )             35.1                           11.6   9.15  )-----------( 702      ( 25 May 2021 06:31 )             34.7       05-26    140  |  110<H>  |  19<H>  ----------------------------<  105<H>  4.1   |  25  |  0.48<L>    Ca    8.6      26 May 2021 08:20  Phos  2.6     05-26  Mg     1.9     05-26    TPro  6.3  /  Alb  2.0<L>  /  TBili  0.5  /  DBili  x   /  AST  25  /  ALT  31  /  AlkPhos  65  05-26 05-25    141  |  109<H>  |  22<H>  ----------------------------<  98  3.3<L>   |  25  |  0.44<L>    Ca    8.5      25 May 2021 06:31  Phos  2.6     05-25  Mg     2.2     05-25    TPro  6.4  /  Alb  1.9<L>  /  TBili  0.4  /  DBili  x   /  AST  27  /  ALT  33  /  AlkPhos  70  05-25      C-Reactive Protein, Serum (05.23.21 @ 23:27)   C-Reactive Protein, Serum: 144:      MEDICATIONS  (STANDING):    ascorbic acid 2000 milliGRAM(s) Oral daily  cholecalciferol 5000 Unit(s) Oral daily  dexAMETHasone  Injectable 6 milliGRAM(s) IV Push daily  enoxaparin Injectable 40 milliGRAM(s) SubCutaneous daily  famotidine    Tablet 20 milliGRAM(s) Oral two times a day  montelukast 10 milliGRAM(s) Oral every 24 hours  zinc sulfate 220 milliGRAM(s) Oral daily        RADIOLOGY & ADDITIONAL TESTS:    5/23/21 : Xray Chest 1 View- PORTABLE-Urgent (05.23.21 @ 15:08) : Bilateral infiltrates left greater than right.      MICROBIOLOGY DATA:    Respiratory Viral Panel with COVID-19 by KATLYN (05.25.21 @ 09:52)   Rapid RVP Result: Detected   SARS-CoV-2: Detected    COVID-19 Yossi Domain Antibody (05.24.21 @ 22:04)   COVID-19 Yossi Domain Antibody Result: 222.00    Culture - Blood (05.23.21 @ 18:46)   Specimen Source: .Blood Blood-Peripheral   Culture Results: No growth to date.     Culture - Blood (05.23.21 @ 18:46)   Specimen Source: .Blood Blood-Peripheral   Culture Results: No growth to date.     COVID-19 PCR . (05.23.21 @ 15:27)   COVID-19 PCR: Detected

## 2021-05-26 NOTE — PROGRESS NOTE ADULT - ASSESSMENT
70 y/o female with no significant pmhx presents to ED with c/o body ache and difficulty breathing x 8 days. Patient notes  is admitted to ICU in [place] for Covid. Pt was admitted for acute hypoxic respiratory failure due to COVID.     On arrival noted 80% on RA, now on NC3L 96%. Pt didn't take covid vaccine. taking no meds at home.     70 y/o female with no significant pmhx presents to ED with c/o body ache and difficulty breathing x 8 days. Patient notes  is admitted to ICU in [place] for Covid. Pt was admitted for acute hypoxic respiratory failure due to COVID.   On arrival noted 80% on RA, now on NC3L 96%. Pt didn't take covid vaccine. taking no meds at home.

## 2021-05-26 NOTE — PROGRESS NOTE ADULT - PROBLEM SELECTOR PLAN 3
IMPROVE VTE Individual Risk Assessment  RISK                                                                Points  [  ] Previous VTE                                                  3  [  ] Thrombophilia                                               2  [  ] Lower limb paralysis                                      2        (unable to hold up >15 seconds)    [  ] Current Cancer                                              2         (within 6 months)  [x  ] Immobilization > 24 hrs                                1  [  ] ICU/CCU stay > 24 hours                              1  [ x ] Age > 60                                                      1  IMPROVE VTE Score ___2______  Lovenox for DVT prophylaxis 40 SC QD

## 2021-05-26 NOTE — PROGRESS NOTE ADULT - PROBLEM SELECTOR PLAN 1
Now on NC 5L spO2: 93%- 94%  likely due to covid PNA  Less likely PE in the setting of normal D dimer and no tachycardia  CXR showed b/l infiltrates left more than right.  c/w oxygen supplementation with spO2> 94%  monitor respiratory status  trend inflammatory markers  - f/u RVP  - Remdes 5/24-25 DC as COVID Ab positive  - Dexa 5/24 - 06/1 Now on NC 5L spO2: 93%- 94%  likely due to covid PNA  Less likely PE in the setting of normal D dimer and no tachycardia  CXR showed b/l infiltrates left more than right.  c/w oxygen supplementation with spO2> 94%  monitor respiratory status  trend inflammatory markers  - RVP POSITIVE for COVID on repeat  - Remdes 5/24-25 DC as COVID Ab positive  - Dexa 5/24 - 06/1

## 2021-05-26 NOTE — PROGRESS NOTE ADULT - ASSESSMENT
Patient is a 71y old  Female with no significant pmhx presents to the ER for evaluation of body ache, fever and difficulty breathing x 8 days. Her  is admitted to ICU in [place] for COVID. ON arrival she found to have hypoxia to 80 %, tachycardia and tachypnea. The CXR shows Bilateral infiltrates left greater than right. She has started on Remdesivir and Dexamethasone, but after 3 doses  Remdesivir ha sbeen stopped due to positive Antibody. Currently she is on Dexamethasone and Anticoagulation. The ID consult requested to assist with further management of COVID.     # COVID pneumonia  # Acute Respiratory failure- on oxygen via NC  # COVID Ab positive- 5/24/21    Would recommend:    1.  Continue  Dexamethasone  2. Continue  supportive care   3. Wean off oxygen as tolerated   4. COVID precaution    dr. Sánchez and patient     Attending Attestation:    Spent more than 45 minutes on total encounter, more than 50 % of the visit was spent counseling and/or coordinating care by the Attending physician. Patient is a 71y old  Female with no significant pmhx presents to the ER for evaluation of body ache, fever and difficulty breathing x 8 days. Her  is admitted to ICU in [place] for COVID. ON arrival she found to have hypoxia to 80 %, tachycardia and tachypnea. The CXR shows Bilateral infiltrates left greater than right. She has started on Remdesivir and Dexamethasone, but after 3 doses  Remdesivir ha sbeen stopped due to positive Antibody. Currently she is on Dexamethasone and Anticoagulation. The ID consult requested to assist with further management of COVID.     # COVID pneumonia  # Acute Respiratory failure- on oxygen via NC  # COVID Ab positive- 5/24/21 - ? vaccination vs prior exposure    Would recommend:    1. Continue Dexamethasone to complete the course or until discharge   2. Continue  supportive care   3. Please wean off oxygen as tolerated   4. COVID precaution  5. OOB to chair       Attending Attestation:    Spent more than 45 minutes on total encounter, more than 50 % of the visit was spent counseling and/or coordinating care by the Attending physician.

## 2021-05-26 NOTE — PROGRESS NOTE ADULT - SUBJECTIVE AND OBJECTIVE BOX
Time of Visit:  Patient seen and examined.     MEDICATIONS  (STANDING):  ascorbic acid 2000 milliGRAM(s) Oral daily  cholecalciferol 5000 Unit(s) Oral daily  dexAMETHasone  Injectable 6 milliGRAM(s) IV Push daily  enoxaparin Injectable 40 milliGRAM(s) SubCutaneous daily  famotidine    Tablet 20 milliGRAM(s) Oral two times a day  montelukast 10 milliGRAM(s) Oral every 24 hours  zinc sulfate 220 milliGRAM(s) Oral daily      MEDICATIONS  (PRN):  acetaminophen   Tablet .. 650 milliGRAM(s) Oral every 6 hours PRN Temp greater or equal to 38C (100.4F), Mild Pain (1 - 3)       Medications up to date at time of exam.      PHYSICAL EXAMINATION:  Patient has no new complaints.  GENERAL: The patient is a well-developed, well-nourished, in no apparent distress.     Vital Signs Last 24 Hrs  T(C): 36.4 (26 May 2021 14:54), Max: 36.4 (25 May 2021 21:07)  T(F): 97.5 (26 May 2021 14:54), Max: 97.6 (25 May 2021 21:07)  HR: 74 (26 May 2021 14:54) (70 - 75)  BP: 124/44 (26 May 2021 14:54) (113/39 - 124/44)  BP(mean): --  RR: 16 (26 May 2021 14:54) (16 - 18)  SpO2: 96% (26 May 2021 14:54) (95% - 96%)   (if applicable)    Chest Tube (if applicable)    HEENT: Head is normocephalic and atraumatic. Extraocular muscles are intact. Mucous membranes are moist.     NECK: Supple, no palpable adenopathy.    LUNGS: Clear to auscultation, no wheezing, rales, or rhonchi.    HEART: Regular rate and rhythm without murmur.    ABDOMEN: Soft, nontender, and nondistended.  No hepatosplenomegaly is noted.    : No painful voiding, no pelvic pain    EXTREMITIES: Without any cyanosis, clubbing, rash, lesions or edema.    NEUROLOGIC: Awake, alert, oriented, grossly intact    SKIN: Warm, dry, good turgor.      LABS:                        11.8   12.75 )-----------( 728      ( 26 May 2021 08:20 )             35.1     05-26    140  |  110<H>  |  19<H>  ----------------------------<  105<H>  4.1   |  25  |  0.48<L>    Ca    8.6      26 May 2021 08:20  Phos  2.6     05-26  Mg     1.9     05-26    TPro  6.3  /  Alb  2.0<L>  /  TBili  0.5  /  DBili  x   /  AST  25  /  ALT  31  /  AlkPhos  65  05-26              D-Dimer Assay, Quantitative: 504 ng/mL DDU (05-26-21 @ 08:20)        Procalcitonin, Serum: 0.10 ng/mL (05-24-21 @ 22:01)  Procalcitonin, Serum: 0.10 ng/mL (05-23-21 @ 23:27)      MICROBIOLOGY: (if applicable)    RADIOLOGY & ADDITIONAL STUDIES:  EKG:   CXR:  ECHO:    IMPRESSION: 71y Female PAST MEDICAL & SURGICAL HISTORY:  No pertinent past medical history    No pertinent past medical history    No significant past surgical history    No pertinent past surgical history     p/w            Impression: This is a  71 yr old  woman  presented to ED with body ache and difficulty breathing x 8 days. Patient notes  is admitted to ICU in [place] for Covid.  With O2 saturation of 80% in ED with episode f Difficulty due to Acute Hypoxic Respiratory Failure secondary to  Pneumonia from covid-19 infection    [place]  -d/c  Remdesivir  due to positive covid-19 antibodies   -continue decadron 6 mg/day x 10 days ( started 5/24  -continue O2 supp as needed to maintain sat >90%  -monitor biomarkers TIW  -f/u cultures  -isolation : contact and air borne   -dvt/gi prophy

## 2021-05-27 LAB
ALBUMIN SERPL ELPH-MCNC: 2 G/DL — LOW (ref 3.5–5)
ALP SERPL-CCNC: 75 U/L — SIGNIFICANT CHANGE UP (ref 40–120)
ALT FLD-CCNC: 43 U/L DA — SIGNIFICANT CHANGE UP (ref 10–60)
ANION GAP SERPL CALC-SCNC: 9 MMOL/L — SIGNIFICANT CHANGE UP (ref 5–17)
AST SERPL-CCNC: 43 U/L — HIGH (ref 10–40)
BASOPHILS # BLD AUTO: 0.02 K/UL — SIGNIFICANT CHANGE UP (ref 0–0.2)
BASOPHILS NFR BLD AUTO: 0.1 % — SIGNIFICANT CHANGE UP (ref 0–2)
BILIRUB SERPL-MCNC: 0.6 MG/DL — SIGNIFICANT CHANGE UP (ref 0.2–1.2)
BUN SERPL-MCNC: 16 MG/DL — SIGNIFICANT CHANGE UP (ref 7–18)
CALCIUM SERPL-MCNC: 8.5 MG/DL — SIGNIFICANT CHANGE UP (ref 8.4–10.5)
CHLORIDE SERPL-SCNC: 109 MMOL/L — HIGH (ref 96–108)
CO2 SERPL-SCNC: 22 MMOL/L — SIGNIFICANT CHANGE UP (ref 22–31)
CREAT SERPL-MCNC: 0.48 MG/DL — LOW (ref 0.5–1.3)
EOSINOPHIL # BLD AUTO: 0.06 K/UL — SIGNIFICANT CHANGE UP (ref 0–0.5)
EOSINOPHIL NFR BLD AUTO: 0.4 % — SIGNIFICANT CHANGE UP (ref 0–6)
FERRITIN SERPL-MCNC: 422 NG/ML — HIGH (ref 15–150)
GLUCOSE SERPL-MCNC: 108 MG/DL — HIGH (ref 70–99)
HCT VFR BLD CALC: 37 % — SIGNIFICANT CHANGE UP (ref 34.5–45)
HGB BLD-MCNC: 12.5 G/DL — SIGNIFICANT CHANGE UP (ref 11.5–15.5)
IMM GRANULOCYTES NFR BLD AUTO: 1.2 % — SIGNIFICANT CHANGE UP (ref 0–1.5)
LDH SERPL L TO P-CCNC: 281 U/L — HIGH (ref 120–225)
LYMPHOCYTES # BLD AUTO: 1.14 K/UL — SIGNIFICANT CHANGE UP (ref 1–3.3)
LYMPHOCYTES # BLD AUTO: 8.3 % — LOW (ref 13–44)
MAGNESIUM SERPL-MCNC: 2.2 MG/DL — SIGNIFICANT CHANGE UP (ref 1.6–2.6)
MCHC RBC-ENTMCNC: 30.1 PG — SIGNIFICANT CHANGE UP (ref 27–34)
MCHC RBC-ENTMCNC: 33.8 GM/DL — SIGNIFICANT CHANGE UP (ref 32–36)
MCV RBC AUTO: 89.2 FL — SIGNIFICANT CHANGE UP (ref 80–100)
MONOCYTES # BLD AUTO: 0.48 K/UL — SIGNIFICANT CHANGE UP (ref 0–0.9)
MONOCYTES NFR BLD AUTO: 3.5 % — SIGNIFICANT CHANGE UP (ref 2–14)
NEUTROPHILS # BLD AUTO: 11.86 K/UL — HIGH (ref 1.8–7.4)
NEUTROPHILS NFR BLD AUTO: 86.5 % — HIGH (ref 43–77)
NRBC # BLD: 0 /100 WBCS — SIGNIFICANT CHANGE UP (ref 0–0)
PHOSPHATE SERPL-MCNC: 2.8 MG/DL — SIGNIFICANT CHANGE UP (ref 2.5–4.5)
PLATELET # BLD AUTO: 792 K/UL — HIGH (ref 150–400)
POTASSIUM SERPL-MCNC: 4.1 MMOL/L — SIGNIFICANT CHANGE UP (ref 3.5–5.3)
POTASSIUM SERPL-SCNC: 4.1 MMOL/L — SIGNIFICANT CHANGE UP (ref 3.5–5.3)
PROT SERPL-MCNC: 6.8 G/DL — SIGNIFICANT CHANGE UP (ref 6–8.3)
RBC # BLD: 4.15 M/UL — SIGNIFICANT CHANGE UP (ref 3.8–5.2)
RBC # FLD: 12.4 % — SIGNIFICANT CHANGE UP (ref 10.3–14.5)
SODIUM SERPL-SCNC: 140 MMOL/L — SIGNIFICANT CHANGE UP (ref 135–145)
WBC # BLD: 13.72 K/UL — HIGH (ref 3.8–10.5)
WBC # FLD AUTO: 13.72 K/UL — HIGH (ref 3.8–10.5)

## 2021-05-27 PROCEDURE — 71045 X-RAY EXAM CHEST 1 VIEW: CPT | Mod: 26

## 2021-05-27 RX ADMIN — MONTELUKAST 10 MILLIGRAM(S): 4 TABLET, CHEWABLE ORAL at 16:27

## 2021-05-27 RX ADMIN — Medication 5000 UNIT(S): at 12:03

## 2021-05-27 RX ADMIN — Medication 6 MILLIGRAM(S): at 05:33

## 2021-05-27 RX ADMIN — Medication 2000 MILLIGRAM(S): at 12:03

## 2021-05-27 RX ADMIN — FAMOTIDINE 20 MILLIGRAM(S): 10 INJECTION INTRAVENOUS at 17:43

## 2021-05-27 RX ADMIN — ZINC SULFATE TAB 220 MG (50 MG ZINC EQUIVALENT) 220 MILLIGRAM(S): 220 (50 ZN) TAB at 12:02

## 2021-05-27 RX ADMIN — ENOXAPARIN SODIUM 40 MILLIGRAM(S): 100 INJECTION SUBCUTANEOUS at 12:03

## 2021-05-27 RX ADMIN — FAMOTIDINE 20 MILLIGRAM(S): 10 INJECTION INTRAVENOUS at 05:33

## 2021-05-27 NOTE — PROGRESS NOTE ADULT - ASSESSMENT
70 y/o female with no significant pmhx presents to ED with c/o body ache and difficulty breathing x 8 days. Patient notes  is admitted to ICU in [place] for Covid. Pt was admitted for acute hypoxic respiratory failure due to COVID.   On arrival noted 80% on RA, now on NC3L 96%. Pt didn't take covid vaccine. taking no meds at home.

## 2021-05-27 NOTE — PROGRESS NOTE ADULT - SUBJECTIVE AND OBJECTIVE BOX
Time of Visit:  Patient seen and examined.     MEDICATIONS  (STANDING):  ascorbic acid 2000 milliGRAM(s) Oral daily  cholecalciferol 5000 Unit(s) Oral daily  dexAMETHasone  Injectable 6 milliGRAM(s) IV Push daily  enoxaparin Injectable 40 milliGRAM(s) SubCutaneous daily  famotidine    Tablet 20 milliGRAM(s) Oral two times a day  montelukast 10 milliGRAM(s) Oral every 24 hours  zinc sulfate 220 milliGRAM(s) Oral daily      MEDICATIONS  (PRN):  acetaminophen   Tablet .. 650 milliGRAM(s) Oral every 6 hours PRN Temp greater or equal to 38C (100.4F), Mild Pain (1 - 3)       Medications up to date at time of exam.    ROS; No fever, chills, cough, congestion.   PHYSICAL EXAMINATION:  Vital Signs Last 24 Hrs  T(C): 36.8 (27 May 2021 14:26), Max: 36.8 (26 May 2021 22:04)  T(F): 98.2 (27 May 2021 14:26), Max: 98.3 (26 May 2021 22:04)  HR: 76 (27 May 2021 14:26) (67 - 76)  BP: 123/44 (27 May 2021 14:26) (123/44 - 131/44)  BP(mean): --  RR: 16 (27 May 2021 14:26) (16 - 17)  SpO2: 95% (27 May 2021 14:26) (94% - 96%)   (if applicable)    General: Alert and oriented. Able to answer question at rest with no SOB. No acute distress.      HEENT: Head is normocephalic and atraumatic. No nasal tenderness. Mucous membranes are moist.     NECK: Supple, no palpable adenopathy.    LUNGS: Clear to auscultation bilaterally with no wheezing, rales, or rhonchi. No use of accessory muscle.     HEART: S1 S2 Regular rate and no click/ rub.     ABDOMEN: Soft, nontender, and nondistended. Active bowel sounds.     EXTREMITIES: Without any cyanosis, clubbing, rash, lesions or edema.    NEUROLOGIC: Awake, alert, oriented.     SKIN: Warm and moist. Non diaphoretic.           LABS:                        12.5   13.72 )-----------( 792      ( 27 May 2021 08:05 )             37.0     05-27    140  |  109<H>  |  16  ----------------------------<  108<H>  4.1   |  22  |  0.48<L>    Ca    8.5      27 May 2021 08:05  Phos  2.8     05-27  Mg     2.2     05-27    TPro  6.8  /  Alb  2.0<L>  /  TBili  0.6  /  DBili  x   /  AST  43<H>  /  ALT  43  /  AlkPhos  75  05-27        Procalcitonin, Serum: 0.10 ng/mL (05-24-21 @ 22:01)      MICROBIOLOGY: (if applicable)    RADIOLOGY & ADDITIONAL STUDIES:  EKG:   CXR:  ECHO:    IMPRESSION: 71y Female PAST MEDICAL & SURGICAL HISTORY:  No pertinent past medical history    No pertinent past medical history    No significant past surgical history    No pertinent past surgical history     Impression: 72 Y/O Female presented to ED with body ache and difficulty breathing x 8 days. Patient notes  is admitted to ICU in [place] for Covid.  With O2 saturation of 80% in ED with episode f Difficulty due to Acute Hypoxic Respiratory Failure secondary to  Pneumonia from covid-19 infection . . Positive Covid 19 PCR on 05-23-21. CXR with bilateral Infiltrates.     Suggestion:  O2 saturation 95% and continue Oxygen supplementation 2L NC.    Dexamethasone 6 mg Daily x 10 days till 06-01-21 .   Pulmonary oral hygiene care.   Remdesivir was discontinued due to Positive Covid Ab .    Monitor biomarkes   DVT/ GI prophylactic.       Airborne and contact precautions.     Time of Visit:  Patient seen and examined.     MEDICATIONS  (STANDING):  ascorbic acid 2000 milliGRAM(s) Oral daily  cholecalciferol 5000 Unit(s) Oral daily  dexAMETHasone  Injectable 6 milliGRAM(s) IV Push daily  enoxaparin Injectable 40 milliGRAM(s) SubCutaneous daily  famotidine    Tablet 20 milliGRAM(s) Oral two times a day  montelukast 10 milliGRAM(s) Oral every 24 hours  zinc sulfate 220 milliGRAM(s) Oral daily      MEDICATIONS  (PRN):  acetaminophen   Tablet .. 650 milliGRAM(s) Oral every 6 hours PRN Temp greater or equal to 38C (100.4F), Mild Pain (1 - 3)       Medications up to date at time of exam.    ROS; No fever, chills, cough, congestion.   PHYSICAL EXAMINATION:  Vital Signs Last 24 Hrs  T(C): 36.8 (27 May 2021 14:26), Max: 36.8 (26 May 2021 22:04)  T(F): 98.2 (27 May 2021 14:26), Max: 98.3 (26 May 2021 22:04)  HR: 76 (27 May 2021 14:26) (67 - 76)  BP: 123/44 (27 May 2021 14:26) (123/44 - 131/44)  BP(mean): --  RR: 16 (27 May 2021 14:26) (16 - 17)  SpO2: 95% (27 May 2021 14:26) (94% - 96%)   (if applicable)    General: Alert and oriented. Able to answer question at rest with no SOB. No acute distress.      HEENT: Head is normocephalic and atraumatic. No nasal tenderness. Mucous membranes are moist.     NECK: Supple, no palpable adenopathy.    LUNGS: Clear to auscultation bilaterally with no wheezing, rales, or rhonchi. No use of accessory muscle.     HEART: S1 S2 Regular rate and no click/ rub.     ABDOMEN: Soft, nontender, and nondistended. Active bowel sounds.     EXTREMITIES: Without any cyanosis, clubbing, rash, lesions or edema.    NEUROLOGIC: Awake, alert, oriented.     SKIN: Warm and moist. Non diaphoretic.           LABS:                        12.5   13.72 )-----------( 792      ( 27 May 2021 08:05 )             37.0     05-27    140  |  109<H>  |  16  ----------------------------<  108<H>  4.1   |  22  |  0.48<L>    Ca    8.5      27 May 2021 08:05  Phos  2.8     05-27  Mg     2.2     05-27    TPro  6.8  /  Alb  2.0<L>  /  TBili  0.6  /  DBili  x   /  AST  43<H>  /  ALT  43  /  AlkPhos  75  05-27        Procalcitonin, Serum: 0.10 ng/mL (05-24-21 @ 22:01)      MICROBIOLOGY: (if applicable)    RADIOLOGY & ADDITIONAL STUDIES:  EKG:   CXR:  ECHO:    IMPRESSION: 71y Female PAST MEDICAL & SURGICAL HISTORY:  No pertinent past medical history    No pertinent past medical history    No significant past surgical history    No pertinent past surgical history     Impression: 72 Y/O Female presented to ED with body ache and difficulty breathing x 8 days. Patient notes  is admitted to ICU in [place] for Covid.  With O2 saturation of 80% in ED with episode f Difficulty due to Acute Hypoxic Respiratory Failure secondary to  Pneumonia from covid-19 infection . . Positive Covid 19 PCR on 05-23-21. CXR with bilateral Infiltrates.     Suggestion:  O2 saturation 95% and continue Oxygen supplementation 2L NC.    Dexamethasone 6 mg Daily x 10 days till 06-01-21 .   Pulmonary oral hygiene care.   Remdesivir was discontinued due to Positive Covid Ab .    Monitor biomarkes   DVT/ GI prophylactic.       Airborne and contact precautions.      Agree with above assessment and plan as transcribed.

## 2021-05-27 NOTE — PROGRESS NOTE ADULT - SUBJECTIVE AND OBJECTIVE BOX
Patient is seen and examined at the bed side, is afebrile. The oxygen requirement went up.      REVIEW OF SYSTEMS: All other review systems are negative      ALLERGIES: No Known Allergies      Vital Signs Last 24 Hrs  T(C): 36.8 (27 May 2021 14:26), Max: 36.8 (26 May 2021 22:04)  T(F): 98.2 (27 May 2021 14:26), Max: 98.3 (26 May 2021 22:04)  HR: 76 (27 May 2021 14:26) (67 - 76)  BP: 123/44 (27 May 2021 14:26) (123/44 - 131/44)  BP(mean): --  RR: 16 (27 May 2021 14:26) (16 - 17)  SpO2: 95% (27 May 2021 14:26) (94% - 96%)      PHYSICAL EXAM:  GENERAL: Not in distress, on oxygen via NC  CHEST/LUNG: Not using accessory muscles  HEART: s1 and s2 present  ABDOMEN:  Nontender and  Nondistended  EXTREMITIES: No pedal  edema  CNS: Awake and Alert      LABS:                          12.5   13.72 )-----------( 792      ( 27 May 2021 08:05 )             37.0                           11.8   12.75 )-----------( 728      ( 26 May 2021 08:20 )             35.1         05-27    140  |  109<H>  |  16  ----------------------------<  108<H>  4.1   |  22  |  0.48<L>    Ca    8.5      27 May 2021 08:05  Phos  2.8     05-27  Mg     2.2     05-27    TPro  6.8  /  Alb  2.0<L>  /  TBili  0.6  /  DBili  x   /  AST  43<H>  /  ALT  43  /  AlkPhos  75  05-27 05-26    140  |  110<H>  |  19<H>  ----------------------------<  105<H>  4.1   |  25  |  0.48<L>    Ca    8.6      26 May 2021 08:20  Phos  2.6     05-26  Mg     1.9     05-26    TPro  6.3  /  Alb  2.0<L>  /  TBili  0.5  /  DBili  x   /  AST  25  /  ALT  31  /  AlkPhos  65  05-26      C-Reactive Protein, Serum (05.23.21 @ 23:27)   C-Reactive Protein, Serum: 144:      MEDICATIONS  (STANDING):    ascorbic acid 2000 milliGRAM(s) Oral daily  cholecalciferol 5000 Unit(s) Oral daily  dexAMETHasone  Injectable 6 milliGRAM(s) IV Push daily  enoxaparin Injectable 40 milliGRAM(s) SubCutaneous daily  famotidine    Tablet 20 milliGRAM(s) Oral two times a day  montelukast 10 milliGRAM(s) Oral every 24 hours  zinc sulfate 220 milliGRAM(s) Oral daily      RADIOLOGY & ADDITIONAL TESTS:    5/23/21 : Xray Chest 1 View- PORTABLE-Urgent (05.23.21 @ 15:08) : Bilateral infiltrates left greater than right.      MICROBIOLOGY DATA:    Respiratory Viral Panel with COVID-19 by KATLYN (05.25.21 @ 09:52)   Rapid RVP Result: Detected   SARS-CoV-2: Detected    COVID-19 Yossi Domain Antibody (05.24.21 @ 22:04)   COVID-19 Yossi Domain Antibody Result: 222.00    Culture - Blood (05.23.21 @ 18:46)   Specimen Source: .Blood Blood-Peripheral   Culture Results: No growth to date.     Culture - Blood (05.23.21 @ 18:46)   Specimen Source: .Blood Blood-Peripheral   Culture Results: No growth to date.     COVID-19 PCR . (05.23.21 @ 15:27)   COVID-19 PCR: Detected             Patient is seen and examined at the bed side, is afebrile. She is doing better. The oxygen requirement came down to 2 liter. The WBC count stay elevated.       REVIEW OF SYSTEMS: All other review systems are negative      ALLERGIES: No Known Allergies      Vital Signs Last 24 Hrs  T(C): 36.8 (27 May 2021 14:26), Max: 36.8 (26 May 2021 22:04)  T(F): 98.2 (27 May 2021 14:26), Max: 98.3 (26 May 2021 22:04)  HR: 76 (27 May 2021 14:26) (67 - 76)  BP: 123/44 (27 May 2021 14:26) (123/44 - 131/44)  BP(mean): --  RR: 16 (27 May 2021 14:26) (16 - 17)  SpO2: 95% (27 May 2021 14:26) (94% - 96%)      PHYSICAL EXAM:  GENERAL: Not in distress, on oxygen via NC  CHEST/LUNG: Not using accessory muscles  HEART: s1 and s2 present  ABDOMEN:  Nontender and  Nondistended  EXTREMITIES: No pedal  edema  CNS: Awake and Alert      LABS:                          12.5   13.72 )-----------( 792      ( 27 May 2021 08:05 )             37.0                           11.8   12.75 )-----------( 728      ( 26 May 2021 08:20 )             35.1         05-27    140  |  109<H>  |  16  ----------------------------<  108<H>  4.1   |  22  |  0.48<L>    Ca    8.5      27 May 2021 08:05  Phos  2.8     05-27  Mg     2.2     05-27    TPro  6.8  /  Alb  2.0<L>  /  TBili  0.6  /  DBili  x   /  AST  43<H>  /  ALT  43  /  AlkPhos  75  05-27 05-26    140  |  110<H>  |  19<H>  ----------------------------<  105<H>  4.1   |  25  |  0.48<L>    Ca    8.6      26 May 2021 08:20  Phos  2.6     05-26  Mg     1.9     05-26    TPro  6.3  /  Alb  2.0<L>  /  TBili  0.5  /  DBili  x   /  AST  25  /  ALT  31  /  AlkPhos  65  05-26      Procalcitonin, Serum (05.24.21 @ 22:01)  : 0.10  C-Reactive Protein, Serum (05.23.21 @ 23:27) : 144      MEDICATIONS  (STANDING):    ascorbic acid 2000 milliGRAM(s) Oral daily  cholecalciferol 5000 Unit(s) Oral daily  dexAMETHasone  Injectable 6 milliGRAM(s) IV Push daily  enoxaparin Injectable 40 milliGRAM(s) SubCutaneous daily  famotidine    Tablet 20 milliGRAM(s) Oral two times a day  montelukast 10 milliGRAM(s) Oral every 24 hours  zinc sulfate 220 milliGRAM(s) Oral daily      RADIOLOGY & ADDITIONAL TESTS:    5/23/21 : Xray Chest 1 View- PORTABLE-Urgent (05.23.21 @ 15:08) : Bilateral infiltrates left greater than right.      MICROBIOLOGY DATA:    Respiratory Viral Panel with COVID-19 by KATLYN (05.25.21 @ 09:52)   Rapid RVP Result: Detected   SARS-CoV-2: Detected    COVID-19 Yossi Domain Antibody (05.24.21 @ 22:04)   COVID-19 Yossi Domain Antibody Result: 222.00    Culture - Blood (05.23.21 @ 18:46)   Specimen Source: .Blood Blood-Peripheral   Culture Results: No growth to date.     Culture - Blood (05.23.21 @ 18:46)   Specimen Source: .Blood Blood-Peripheral   Culture Results: No growth to date.     COVID-19 PCR . (05.23.21 @ 15:27)   COVID-19 PCR: Detected

## 2021-05-27 NOTE — PROGRESS NOTE ADULT - ASSESSMENT
Patient is a 71y old  Female with no significant pmhx presents to the ER for evaluation of body ache, fever and difficulty breathing x 8 days. Her  is admitted to ICU in [place] for COVID. ON arrival she found to have hypoxia to 80 %, tachycardia and tachypnea. The CXR shows Bilateral infiltrates left greater than right. She has started on Remdesivir and Dexamethasone, but after 3 doses  Remdesivir ha sbeen stopped due to positive Antibody. Currently she is on Dexamethasone and Anticoagulation. The ID consult requested to assist with further management of COVID.     # COVID pneumonia  # Acute Respiratory failure- on oxygen via NC  # COVID Ab positive- 5/24/21 - ? vaccination vs prior exposure    Would recommend:    1. Continue Dexamethasone to complete the course or until discharge   2. Continue  supportive care   3. Please wean off oxygen as tolerated   4. COVID precaution  5. OOB to chair       Attending Attestation:    Spent more than 45 minutes on total encounter, more than 50 % of the visit was spent counseling and/or coordinating care by the Attending physician. Patient is a 71y old  Female with no significant pmhx presents to the ER for evaluation of body ache, fever and difficulty breathing x 8 days. Her  is admitted to ICU in [place] for COVID. ON arrival she found to have hypoxia to 80 %, tachycardia and tachypnea. The CXR shows Bilateral infiltrates left greater than right. She has started on Remdesivir and Dexamethasone, but after 3 doses  Remdesivir ha sbeen stopped due to positive Antibody. Currently she is on Dexamethasone and Anticoagulation. The ID consult requested to assist with further management of COVID.     # COVID pneumonia  # Acute Respiratory failure- on oxygen via NC  # COVID Ab positive- 5/24/21 - ? vaccination vs prior exposure    Would recommend:    1. Monitor inflammatory markers  2. Continue Dexamethasone to complete the course or until discharge   3. Continue  supportive care   4. Please wean off oxygen as tolerated   5. COVID precaution  6. OOB to chair     Attending Attestation:    Spent more than 35 minutes on total encounter, more than 50 % of the visit was spent counseling and/or coordinating care by the Attending physician.

## 2021-05-27 NOTE — PROGRESS NOTE ADULT - SUBJECTIVE AND OBJECTIVE BOX
PGY-1 Progress Note discussed with attending    PAGER #: [[phone]] TILL 5:00 PM  PLEASE CONTACT ON CALL TEAM:   - On Call Team (Please refer to Shanda) FROM 5:00 PM - 8:30PM  - Nightfloat Team FROM 8:30 -7:30 AM    CHIEF COMPLAINT & BRIEF HOSPITAL COURSE:      INTERVAL HPI/OVERNIGHT EVENTS:       REVIEW OF SYSTEMS:  CONSTITUTIONAL: No fever, weight loss, or fatigue  RESPIRATORY: No cough, wheezing, chills or hemoptysis; No shortness of breath  CARDIOVASCULAR: No chest pain, palpitations, dizziness, or leg swelling  GASTROINTESTINAL: No abdominal pain. No nausea, vomiting, or hematemesis; No diarrhea or constipation. No melena or hematochezia.  GENITOURINARY: No dysuria or hematuria, urinary frequency  NEUROLOGICAL: No headaches, memory loss, loss of strength, numbness, or tremors  SKIN: No itching, burning, rashes, or lesions     MEDICATIONS  (STANDING):  ascorbic acid 2000 milliGRAM(s) Oral daily  cholecalciferol 5000 Unit(s) Oral daily  dexAMETHasone  Injectable 6 milliGRAM(s) IV Push daily  enoxaparin Injectable 40 milliGRAM(s) SubCutaneous daily  famotidine    Tablet 20 milliGRAM(s) Oral two times a day  montelukast 10 milliGRAM(s) Oral every 24 hours  zinc sulfate 220 milliGRAM(s) Oral daily    MEDICATIONS  (PRN):  acetaminophen   Tablet .. 650 milliGRAM(s) Oral every 6 hours PRN Temp greater or equal to 38C (100.4F), Mild Pain (1 - 3)      Vital Signs Last 24 Hrs  T(C): 36.8 (27 May 2021 05:22), Max: 36.8 (26 May 2021 22:04)  T(F): 98.2 (27 May 2021 05:22), Max: 98.3 (26 May 2021 22:04)  HR: 67 (27 May 2021 05:22) (67 - 74)  BP: 131/44 (27 May 2021 05:22) (124/44 - 131/44)  BP(mean): --  RR: 16 (27 May 2021 05:22) (16 - 17)  SpO2: 96% (27 May 2021 05:22) (94% - 96%)    PHYSICAL EXAMINATION:  GENERAL: NAD, well built  HEAD:  Atraumatic, Normocephalic  EYES:  conjunctiva and sclera clear  NECK: Supple, No JVD, Normal thyroid  CHEST/LUNG: Clear to auscultation. Clear to percussion bilaterally; No rales, rhonchi, wheezing, or rubs  HEART: Regular rate and rhythm; No murmurs, rubs, or gallops  ABDOMEN: Soft, Nontender, Nondistended; Bowel sounds present  NERVOUS SYSTEM:  Alert & Oriented X3,    EXTREMITIES:  2+ Peripheral Pulses, No clubbing, cyanosis, or edema  SKIN: warm dry                          12.5   13.72 )-----------( 792      ( 27 May 2021 08:05 )             37.0     05-26    140  |  110<H>  |  19<H>  ----------------------------<  105<H>  4.1   |  25  |  0.48<L>    Ca    8.6      26 May 2021 08:20  Phos  2.6     05-26  Mg     1.9     05-26    TPro  6.3  /  Alb  2.0<L>  /  TBili  0.5  /  DBili  x   /  AST  25  /  ALT  31  /  AlkPhos  65  05-26    LIVER FUNCTIONS - ( 26 May 2021 08:20 )  Alb: 2.0 g/dL / Pro: 6.3 g/dL / ALK PHOS: 65 U/L / ALT: 31 U/L DA / AST: 25 U/L / GGT: x                       I&O's Summary      CAPILLARY BLOOD GLUCOSE        CAPILLARY BLOOD GLUCOSE          RADIOLOGY & ADDITIONAL TESTS:                   PGY-1 Progress Note discussed with attending    PAGER #: [[phone]] TILL 5:00 PM  PLEASE CONTACT ON CALL TEAM:   - On Call Team (Please refer to Shanda) FROM 5:00 PM - 8:30PM  - Nightfloat Team FROM 8:30 -7:30 AM    CHIEF COMPLAINT & BRIEF HOSPITAL COURSE:  72 y/o female with no significant pmhx presents to ED with c/o body ache and difficulty breathing x 8 days. Patient notes  is admitted to ICU in [place] for Covid. Pt was admitted for acute hypoxic respiratory failure due to COVID. On arrival noted 80% on RA, now on NC3L 96%. Pt didn't take covid vaccine. taking no meds at home. COVID PCR + positive. Admitted for AHRF 2/2 COVID PNA. CXR showed b/l infiltrates L>>R. Started on Dexamethason and Remdesivir on 5/24. COVID Ab resulted high positive, Remdesivir was discontinued on 5/25. BCx NGTD. RVP positive COVID. BCx NGTD. NC tapered down to 2L, slight leukocytosis in setting of steroid. Procal Negative x2.    INTERVAL HPI/OVERNIGHT EVENTS:   On 2L NC. Ordered PT consult. No new complaints. Very  weak, difficult to mobilize. Ordered CXR to eval infiltrate progression/regression.    REVIEW OF SYSTEMS:  CONSTITUTIONAL: No fever, weight loss, + fatigue  RESPIRATORY: No cough, wheezing, chills or hemoptysis; + shortness of breath improving  CARDIOVASCULAR: No chest pain, palpitations, dizziness, or leg swelling  GASTROINTESTINAL: No abdominal pain. No nausea, vomiting, or hematemesis; No diarrhea or constipation. No melena or hematochezia.  GENITOURINARY: No dysuria or hematuria, urinary frequency  NEUROLOGICAL: No headaches, memory loss, loss of strength, numbness, or tremors  SKIN: No itching, burning, rashes, or lesions     MEDICATIONS  (STANDING):  ascorbic acid 2000 milliGRAM(s) Oral daily  cholecalciferol 5000 Unit(s) Oral daily  dexAMETHasone  Injectable 6 milliGRAM(s) IV Push daily  enoxaparin Injectable 40 milliGRAM(s) SubCutaneous daily  famotidine    Tablet 20 milliGRAM(s) Oral two times a day  montelukast 10 milliGRAM(s) Oral every 24 hours  zinc sulfate 220 milliGRAM(s) Oral daily    MEDICATIONS  (PRN):  acetaminophen   Tablet .. 650 milliGRAM(s) Oral every 6 hours PRN Temp greater or equal to 38C (100.4F), Mild Pain (1 - 3)      Vital Signs Last 24 Hrs  T(C): 36.8 (27 May 2021 05:22), Max: 36.8 (26 May 2021 22:04)  T(F): 98.2 (27 May 2021 05:22), Max: 98.3 (26 May 2021 22:04)  HR: 67 (27 May 2021 05:22) (67 - 74)  BP: 131/44 (27 May 2021 05:22) (124/44 - 131/44)  BP(mean): --  RR: 16 (27 May 2021 05:22) (16 - 17)  SpO2: 96% (27 May 2021 05:22) (94% - 96%)    PHYSICAL EXAMINATION:  GENERAL: NAD, well built, 2L NC  HEAD:  Atraumatic, Normocephalic  EYES:  conjunctiva and sclera clear  NECK: Supple, No JVD, Normal thyroid  CHEST/LUNG: coarse breath sounds throughout lung fields.  No rales, rhonchi, wheezing, or rubs  HEART: Regular rate and rhythm; No murmurs, rubs, or gallops  ABDOMEN: Soft, Nontender, Nondistended; Bowel sounds present  NERVOUS SYSTEM:  Alert & Oriented X3,    EXTREMITIES:  2+ Peripheral Pulses, No clubbing, cyanosis, or edema  SKIN: warm dry                                     12.5   13.72 )-----------( 792      ( 27 May 2021 08:05 )             37.0     05-26    140  |  110<H>  |  19<H>  ----------------------------<  105<H>  4.1   |  25  |  0.48<L>    Ca    8.6      26 May 2021 08:20  Phos  2.6     05-26  Mg     1.9     05-26    TPro  6.3  /  Alb  2.0<L>  /  TBili  0.5  /  DBili  x   /  AST  25  /  ALT  31  /  AlkPhos  65  05-26    LIVER FUNCTIONS - ( 26 May 2021 08:20 )  Alb: 2.0 g/dL / Pro: 6.3 g/dL / ALK PHOS: 65 U/L / ALT: 31 U/L DA / AST: 25 U/L / GGT: x             RADIOLOGY & ADDITIONAL TESTS:    < from: Xray Chest 1 View- PORTABLE-Urgent (05.23.21 @ 15:08) >  EXAM:  XR CHEST PORTABLE URGENT 1V                            PROCEDURE DATE:  05/23/2021          INTERPRETATION:  AP semierect chest on May 23, 2021 at 3:08 PM. Patient is short of breath with cough and fever.    COMPARISON: None available.    Heart magnified by technique.    Mid lower lung field infiltrates left greater than right are noted.    Covid virus is not excluded.    IMPRESSION: Bilateral infiltrates left greater than right.            ELISABET BRENNER M.D., ATTENDING RADIOLOGIST  Thisdocument has been electronically signed. May 24 2021 10:50AM    < end of copied text >

## 2021-05-27 NOTE — PROGRESS NOTE ADULT - PROBLEM SELECTOR PLAN 1
Now on NC 5L spO2: 93%- 94%  likely due to covid PNA  Less likely PE in the setting of normal D dimer and no tachycardia  CXR showed b/l infiltrates left more than right.  c/w oxygen supplementation with spO2> 94%  monitor respiratory status  trend inflammatory markers  - RVP POSITIVE for COVID on repeat  - Remdes 5/24-25 DC as COVID Ab positive  - Dexa 5/24 - 06/1

## 2021-05-28 LAB
A-TUMOR NECROSIS FACT SERPL-MCNC: 2.2 PG/ML — SIGNIFICANT CHANGE UP
ALBUMIN SERPL ELPH-MCNC: 2.1 G/DL — LOW (ref 3.5–5)
ALP SERPL-CCNC: 64 U/L — SIGNIFICANT CHANGE UP (ref 40–120)
ALT FLD-CCNC: 37 U/L DA — SIGNIFICANT CHANGE UP (ref 10–60)
ANION GAP SERPL CALC-SCNC: 5 MMOL/L — SIGNIFICANT CHANGE UP (ref 5–17)
AST SERPL-CCNC: 27 U/L — SIGNIFICANT CHANGE UP (ref 10–40)
BILIRUB SERPL-MCNC: 0.5 MG/DL — SIGNIFICANT CHANGE UP (ref 0.2–1.2)
BUN SERPL-MCNC: 20 MG/DL — HIGH (ref 7–18)
CALCIUM SERPL-MCNC: 8.8 MG/DL — SIGNIFICANT CHANGE UP (ref 8.4–10.5)
CHLORIDE SERPL-SCNC: 106 MMOL/L — SIGNIFICANT CHANGE UP (ref 96–108)
CO2 SERPL-SCNC: 27 MMOL/L — SIGNIFICANT CHANGE UP (ref 22–31)
CREAT SERPL-MCNC: 0.48 MG/DL — LOW (ref 0.5–1.3)
CRP SERPL-MCNC: 38 MG/L — HIGH
CULTURE RESULTS: SIGNIFICANT CHANGE UP
CULTURE RESULTS: SIGNIFICANT CHANGE UP
FERRITIN SERPL-MCNC: 349 NG/ML — HIGH (ref 15–150)
GLUCOSE BLDC GLUCOMTR-MCNC: 124 MG/DL — HIGH (ref 70–99)
GLUCOSE SERPL-MCNC: 90 MG/DL — SIGNIFICANT CHANGE UP (ref 70–99)
HCT VFR BLD CALC: 34.3 % — LOW (ref 34.5–45)
HGB BLD-MCNC: 11.6 G/DL — SIGNIFICANT CHANGE UP (ref 11.5–15.5)
IL10 SERPL-MCNC: 4.9 PG/ML — HIGH
IL12 SERPL-MCNC: <1.9 PG/ML — SIGNIFICANT CHANGE UP
IL13 SERPL-MCNC: 3.7 PG/ML — HIGH
IL17A SERPL-MCNC: <1.4 PG/ML — SIGNIFICANT CHANGE UP
IL2 SERPL-MCNC: 1225.4 PG/ML — HIGH (ref 175.3–858.2)
IL2 SERPL-MCNC: <2.1 PG/ML — SIGNIFICANT CHANGE UP
IL4 SERPL-MCNC: <2.2 PG/ML — SIGNIFICANT CHANGE UP
IL6 SERPL-MCNC: 12.7 PG/ML — HIGH
IL8 SERPL-MCNC: <3 PG/ML — SIGNIFICANT CHANGE UP
INTERFERON GAMMA, BLOOD: <4.2 PG/ML — SIGNIFICANT CHANGE UP
INTERLEUKIN 1 BETA: <6.5 PG/ML — SIGNIFICANT CHANGE UP
INTERLEUKIN 5: <2.1 PG/ML — SIGNIFICANT CHANGE UP
LDH SERPL L TO P-CCNC: 246 U/L — HIGH (ref 120–225)
MAGNESIUM SERPL-MCNC: 2.1 MG/DL — SIGNIFICANT CHANGE UP (ref 1.6–2.6)
MCHC RBC-ENTMCNC: 30.3 PG — SIGNIFICANT CHANGE UP (ref 27–34)
MCHC RBC-ENTMCNC: 33.8 GM/DL — SIGNIFICANT CHANGE UP (ref 32–36)
MCV RBC AUTO: 89.6 FL — SIGNIFICANT CHANGE UP (ref 80–100)
NRBC # BLD: 0 /100 WBCS — SIGNIFICANT CHANGE UP (ref 0–0)
PHOSPHATE SERPL-MCNC: 3.4 MG/DL — SIGNIFICANT CHANGE UP (ref 2.5–4.5)
PLATELET # BLD AUTO: 725 K/UL — HIGH (ref 150–400)
POTASSIUM SERPL-MCNC: 4.1 MMOL/L — SIGNIFICANT CHANGE UP (ref 3.5–5.3)
POTASSIUM SERPL-SCNC: 4.1 MMOL/L — SIGNIFICANT CHANGE UP (ref 3.5–5.3)
PROT SERPL-MCNC: 6.2 G/DL — SIGNIFICANT CHANGE UP (ref 6–8.3)
RBC # BLD: 3.83 M/UL — SIGNIFICANT CHANGE UP (ref 3.8–5.2)
RBC # FLD: 12.6 % — SIGNIFICANT CHANGE UP (ref 10.3–14.5)
SODIUM SERPL-SCNC: 138 MMOL/L — SIGNIFICANT CHANGE UP (ref 135–145)
SPECIMEN SOURCE: SIGNIFICANT CHANGE UP
SPECIMEN SOURCE: SIGNIFICANT CHANGE UP
WBC # BLD: 8.78 K/UL — SIGNIFICANT CHANGE UP (ref 3.8–10.5)
WBC # FLD AUTO: 8.78 K/UL — SIGNIFICANT CHANGE UP (ref 3.8–10.5)

## 2021-05-28 RX ADMIN — MONTELUKAST 10 MILLIGRAM(S): 4 TABLET, CHEWABLE ORAL at 17:12

## 2021-05-28 RX ADMIN — FAMOTIDINE 20 MILLIGRAM(S): 10 INJECTION INTRAVENOUS at 17:12

## 2021-05-28 RX ADMIN — FAMOTIDINE 20 MILLIGRAM(S): 10 INJECTION INTRAVENOUS at 06:24

## 2021-05-28 RX ADMIN — ENOXAPARIN SODIUM 40 MILLIGRAM(S): 100 INJECTION SUBCUTANEOUS at 11:39

## 2021-05-28 RX ADMIN — Medication 2000 MILLIGRAM(S): at 11:40

## 2021-05-28 RX ADMIN — Medication 6 MILLIGRAM(S): at 06:24

## 2021-05-28 RX ADMIN — ZINC SULFATE TAB 220 MG (50 MG ZINC EQUIVALENT) 220 MILLIGRAM(S): 220 (50 ZN) TAB at 11:39

## 2021-05-28 RX ADMIN — Medication 5000 UNIT(S): at 11:40

## 2021-05-28 NOTE — DISCHARGE NOTE PROVIDER - HOSPITAL COURSE
70 y/o female with no significant pmhx presents to ED with c/o body ache and difficulty breathing x 8 days. Patient notes  is admitted to ICU in [place] for Covid. Pt was admitted for acute hypoxic respiratory failure due to COVID. On arrival noted 80% on RA, now on NC3L 96%. Pt didn't take covid vaccine. taking no meds at home. COVID PCR + positive. Admitted for AHRF 2/2 COVID PNA. CXR showed b/l infiltrates L>>R. Started on Dexamethason and Remdesivir on 5/24. COVID Ab resulted high positive, Remdesivir was discontinued on 5/25. BCx NGTD. RVP positive COVID. BCx NGTD. Tapered down from 5L NC to 2L NC. Working with PT OOBTC. CXR 5/27 with interval clearing of right lung base. No overnight events. PT eval pending. VSS, no fevers. Markers stable. Plan for discharge with home oxygen.    Given patient's improved clinical status and current hemodynamic stability, decision was made to discharge. Discussed with attending. Please refer to patient's complete medical chart with documents for a full hospital course, for this is only a brief summary.   72 y/o female with no significant pmhx presents to ED with c/o body ache and difficulty breathing x 8 days. Patient notes  is admitted to ICU in [place] for Covid. Pt was admitted for acute hypoxic respiratory failure due to COVID. On arrival noted 80% on RA, now on NC3L 96%. Pt didn't take covid vaccine. taking no meds at home. COVID PCR + positive. Admitted for AHRF 2/2 COVID PNA. CXR showed b/l infiltrates L>>R. Started on Dexamethason and Remdesivir on 5/24. COVID Ab resulted high positive, Remdesivir was discontinued on 5/25. BCx NGTD. RVP positive COVID. BCx NGTD. Tapered down from 5L NC to 2L NC. Working with PT OOBTC. CXR 5/27 with interval clearing of right lung base. No overnight events. PT eval pending. VSS, no fevers. Patient O2 on room air on ambulation was within normal limits, she was 24 hr on room air prior to discharge and finished her course of steroids. Markers stable.     Given patient's improved clinical status and current hemodynamic stability, decision was made to discharge. Discussed with attending. Please refer to patient's complete medical chart with documents for a full hospital course, for this is only a brief summary.

## 2021-05-28 NOTE — DIETITIAN INITIAL EVALUATION ADULT. - PERTINENT LABORATORY DATA
05-28 Na138 mmol/L Glu 90 mg/dL K+ 4.1 mmol/L Cr  0.48 mg/dL<L> BUN 20 mg/dL<H>   05-28 Phos 3.4 mg/dL   05-28 Alb 2.1 g/dL<L>     05-24 Chol 129 mg/dL LDL --    HDL 36 mg/dL<L> Trig 92 mg/dL    05-24-21 @ 20:22 HgbA1C 6.0      Vitamin D, 25-Hydroxy: 24.3 ng/mL (05-24-21 @ 21:16)

## 2021-05-28 NOTE — DISCHARGE NOTE PROVIDER - NSFOLLOWUPCLINICS_GEN_ALL_ED_FT
[facility]  Internal Medicine  [address] [place] [address]  Phone: [phone]  Fax: [phone]    
yes

## 2021-05-28 NOTE — PROGRESS NOTE ADULT - SUBJECTIVE AND OBJECTIVE BOX
Time of Visit:  Patient seen and examined.     MEDICATIONS  (STANDING):  ascorbic acid 2000 milliGRAM(s) Oral daily  cholecalciferol 5000 Unit(s) Oral daily  dexAMETHasone  Injectable 6 milliGRAM(s) IV Push daily  enoxaparin Injectable 40 milliGRAM(s) SubCutaneous daily  famotidine    Tablet 20 milliGRAM(s) Oral two times a day  montelukast 10 milliGRAM(s) Oral every 24 hours  zinc sulfate 220 milliGRAM(s) Oral daily      MEDICATIONS  (PRN):  acetaminophen   Tablet .. 650 milliGRAM(s) Oral every 6 hours PRN Temp greater or equal to 38C (100.4F), Mild Pain (1 - 3)       Medications up to date at time of exam.      PHYSICAL EXAMINATION:  Patient has no new complaints.  GENERAL: The patient is a well-developed, well-nourished, in no apparent distress.     Vital Signs Last 24 Hrs  T(C): 36.4 (28 May 2021 13:27), Max: 36.6 (28 May 2021 05:23)  T(F): 97.5 (28 May 2021 13:27), Max: 97.8 (28 May 2021 05:23)  HR: 71 (28 May 2021 13:27) (71 - 88)  BP: 118/45 (28 May 2021 13:27) (118/45 - 140/77)  BP(mean): --  RR: 18 (28 May 2021 13:27) (17 - 18)  SpO2: 94% (28 May 2021 13:27) (94% - 95%)   (if applicable)    Chest Tube (if applicable)    HEENT: Head is normocephalic and atraumatic. Extraocular muscles are intact. Mucous membranes are moist.     NECK: Supple, no palpable adenopathy.    LUNGS: Clear to auscultation, no wheezing, rales, or rhonchi.    HEART: Regular rate and rhythm without murmur.    ABDOMEN: Soft, nontender, and nondistended.  No hepatosplenomegaly is noted.    : No painful voiding, no pelvic pain    EXTREMITIES: Without any cyanosis, clubbing, rash, lesions or edema.    NEUROLOGIC: Awake, alert, oriented, grossly intact    SKIN: Warm, dry, good turgor.      LABS:                        11.6   8.78  )-----------( 725      ( 28 May 2021 07:36 )             34.3     05-28    138  |  106  |  20<H>  ----------------------------<  90  4.1   |  27  |  0.48<L>    Ca    8.8      28 May 2021 07:36  Phos  3.4     05-28  Mg     2.1     05-28    TPro  6.2  /  Alb  2.1<L>  /  TBili  0.5  /  DBili  x   /  AST  27  /  ALT  37  /  AlkPhos  64  05-28                        MICROBIOLOGY: (if applicable)    RADIOLOGY & ADDITIONAL STUDIES:  EKG:   CXR:  ECHO:    IMPRESSION: 71y Female PAST MEDICAL & SURGICAL HISTORY:  No pertinent past medical history    No pertinent past medical history    No significant past surgical history    No pertinent past surgical history     p/w         Impression: 72 Y/O Female presented to ED with body ache and difficulty breathing x 8 days. Patient notes  is admitted to ICU in [place] for Covid.  With O2 saturation of 80% in ED with episode f Difficulty due to Acute Hypoxic Respiratory Failure secondary to  Pneumonia from covid-19 infection . . Positive Covid 19 PCR on 05-23-21. CXR with bilateral Infiltrates.     Suggestion:  O2 requirement is decreasing   Dexamethasone 6 mg Daily x 10 days till 06-01-21 .   Pulmonary oral hygiene care.   Remdesivir was discontinued due to Positive Covid Ab .    Monitor biomarkes   DVT/ GI prophylactic.       Airborne and contact precautions.    out pt pulmonary f/u

## 2021-05-28 NOTE — PROGRESS NOTE ADULT - ASSESSMENT
Patient is a 71y old  Female with no significant pmhx presents to the ER for evaluation of body ache, fever and difficulty breathing x 8 days. Her  is admitted to ICU in [place] for COVID. ON arrival she found to have hypoxia to 80 %, tachycardia and tachypnea. The CXR shows Bilateral infiltrates left greater than right. She has started on Remdesivir and Dexamethasone, but after 3 doses  Remdesivir ha sbeen stopped due to positive Antibody. Currently she is on Dexamethasone and Anticoagulation. The ID consult requested to assist with further management of COVID.     # COVID pneumonia  # Acute Respiratory failure- on oxygen via NC  # COVID Ab positive- 5/24/21 - ? vaccination vs prior exposure    Would recommend:    1. OOB to chair   2. Continue Dexamethasone to complete the course or until discharge   3. Continue  supportive care   4. Please wean off oxygen as tolerated   5. COVID precaution    Attending Attestation:    Spent more than 35 minutes on total encounter, more than 50 % of the visit was spent counseling and/or coordinating care by the Attending physician.

## 2021-05-28 NOTE — PROGRESS NOTE ADULT - ASSESSMENT
70 y/o female with no significant pmhx presents to ED with c/o body ache and difficulty breathing x 8 days. Patient notes  is admitted to ICU in [place] for Covid. Pt was admitted for acute hypoxic respiratory failure due to COVID.   On arrival noted 80% on RA, now on NC3L 96%. Pt didn't take covid vaccine. taking no meds at home.

## 2021-05-28 NOTE — DISCHARGE NOTE PROVIDER - NSDCMRMEDTOKEN_GEN_ALL_CORE_FT
Naprosyn 500 mg oral tablet: 1 tab(s) orally 2 times a day, As Needed   Aspirin Low Dose 81 mg oral delayed release tablet: 1 tab(s) orally once a day   D 1000 intl units (25 mcg) oral tablet: 1 tab(s) orally once a day   famotidine 20 mg oral tablet: 1 tab(s) orally once a day

## 2021-05-28 NOTE — DISCHARGE NOTE NURSING/CASE MANAGEMENT/SOCIAL WORK - NSDCFUADDAPPT_GEN_ALL_CORE_FT
[facility]  Telehealth appointment:    Friday, June 11th  at 10:00am  with Dr. Yoni Mukherjee     Call from doctor may be 15 mins earlier or 30 mins later than assigned appointment time.

## 2021-05-28 NOTE — PROGRESS NOTE ADULT - PROBLEM SELECTOR PLAN 1
Now on NC 2L spO2: 94%- 95%  likely due to covid PNA  Less likely PE in the setting of normal D dimer and no tachycardia  CXR showed b/l infiltrates left more than right.  c/w oxygen supplementation with spO2> 94%  monitor respiratory status  trend inflammatory markers  - RVP POSITIVE for COVID on repeat  - Remdes 5/24-25 DC as COVID Ab positive  - Dexa 5/24 - 06/1  - CXR on 05/27 shows interval clearing of right lung base  - taper O2  - PT eval  - OOBTC

## 2021-05-28 NOTE — DIETITIAN INITIAL EVALUATION ADULT. - PROBLEM/PLAN-3
REASON FOR visit:  Lung cancer/Malignant effusion for Hem/Onc follow-up and ongoing treatment.    HISTORY OF PRESENT ILLNESS:  Ms. Sadia Delgado is a 64 year old female was seen initially mid 2020. She presented with worsening shortness of breath and cough for almost 2 months and ultimately ended up having further evaluation leading to CT chest and pericardial effusion  She was transfered from [facility] to [facility] to obtain this procedure and pericardial cytology came back positive for malignant cells.  She was found to have squamous differentiation and suspected lung primary based on clinical evaluations.  She has been initiated on taxol/carboplatin/pembrolizumab. She is now here for further follow-up. She is independent and her SO lives with her.  Since last seen reports no chest pain or shortness of breath.  No nausea or vomiting.  Swelling of the feet lot better.  No diarrhea.  Denies any fever.    She has chronic back for years and has not changed.  She has a steady boyfriend and her daughter is close by.  Her son  several years ago.  She use to work as a retail .  She has been smoking for last 40+ years.      She has past medical history significant for cirrhosis of the liver and hep C and [facility]  She is normally not on oxygen at home.     Rest of the past medical history, family history, social history, allergies and medications as reviewed below and in epic.    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     child's A   • Closed fracture of proximal tibia     right tibial plateau   • Essential (primary) hypertension    • Gastro-oesophageal reflux disease    • Hepatitis C     Dr Engle, GI at [facility]   • High cholesterol    • Malignant neoplasm (CMS/HCC)    • Other chronic pain    • Pneumonia    • S/P  pericardial window creation 7/10/2020   • Shoulder enthesopathy        Past Surgical History:   Procedure Laterality Date   • Appendectomy     • Cardiac surgery      Pericardial window 7/10/2020 for pericardial effusion   • Cataract extraction, bilateral     •  section, low transverse     • Hand surgery Right     thumb, ligament surgery   • Hernia repair      UMBILICAL   • Knee surgery  1974    Right x 2. Left 5 or 6x. Ligament surgery   • Shoulder surgery Right 2015    ligament surgery. [facility]   • Spine surgery      HERNIATED DISK   • Total abdom hysterectomy      Ovaries preserved. Done for fibroids   • Transfusion procedure  1974    x2 or 3       ALLERGIES:  Hydrocodone    MEDICATIONS:  Current Outpatient Medications   Medication Sig Dispense Refill   • vilazodone (Viibryd) 20 MG tablet Take 20 mg by mouth daily.     • morphine, IMM REL, (MSIR) 15 MG tablet Take 15 mg by mouth every 12 hours.     • Magnesium Cl-Calcium Carbonate (Slow Magnesium/Calcium)  MG Tablet Enteric Coated Take 1 tablet by mouth 2 times daily. 60 tablet 0   • pantoprazole (PROTONIX) 40 MG tablet Take 1 tablet by mouth every 12 hours. (Patient taking differently: Take 40 mg by mouth as needed. ) 60 tablet 3   • metoPROLOL succinate (TOPROL-XL) 50 MG 24 hr tablet Take 1 tablet by mouth daily. 30 tablet 3   • ondansetron (ZOFRAN) 8 MG tablet Take 8 mg by mouth every 8 hours as needed for Nausea.     • albuterol 108 (90 Base) MCG/ACT inhaler Inhale 2 puffs into the lungs every 4 hours as needed for Shortness of Breath.     • prochlorperazine (COMPAZINE) 10 MG tablet Take 10 mg by mouth every 6 hours as needed for Nausea.      • dilTIAZem (CARDIZEM CD) 300 MG 24 hr capsule Take 1 capsule by mouth daily. 30 capsule 2   • ALPRAZolam (XANAX) 0.5 MG tablet Take 0.5 tablets by mouth 3 times daily as needed for Anxiety. 20 tablet 0   • apixaBAN (ELIQUIS) 5 MG Tab Take 1 tablet by mouth every 12 hours. 60 tablet  11   • budesonide-formoterol (SYMBICORT) 160-4.5 MCG/ACT inhaler Inhale 2 puffs into the lungs 2 times daily.     • ergocalciferol (DRISDOL) 1.25 mg (50,000 units) capsule Take 50,000 Units by mouth 2 days a week.      • cetirizine (ZYRTEC) 10 MG tablet Take 10 mg by mouth as needed for Allergies.     • oxyCODONE HCl 10 MG immediate release tablet Take 10 mg by mouth 4 times daily.      • spironolactone (ALDACTONE) 25 MG tablet Take 1 tablet by mouth daily. Do not start before 2020. 30 tablet 0   • colchicine (COLCRYS) 0.6 MG tablet Take 1 tablet by mouth daily. Do not start before 2020. 30 tablet 2     No current facility-administered medications for this visit.          HISTORY:  Social History     Tobacco Use   • Smoking status: Former Smoker     Packs/day: 0.50     Years: 42.00     Pack years: 21.00     Types: Cigarettes     Start date: 10/8/1971     Quit date: 2020     Years since quittin.3   • Smokeless tobacco: Never Used   • Tobacco comment: Started age 16   Substance Use Topics   • Alcohol use: No   • Drug use: Yes     Types: Marijuana     Comment: Last use       Occupation:  Retired sales    Family History   Problem Relation Age of Onset   • Heart disease Father 63        AMI   • Substance abuse Son    • Asthma Son      REVIEW OF SYSTEMS:  As documented by MA reviewed and agree.    Physical Examination:  Sadia Delgado is a 64 year old female who is alert, oriented times 3, in no apparent distress.    Visit Vitals  /68 (BP Location: RUE - Right upper extremity, Patient Position: Sitting, Cuff Size: Regular)   Pulse 66   Temp 98.1 °F (36.7 °C)   Resp 20   Ht 5' 3\" (1.6 m)   Wt 70 kg   SpO2 96%   BMI 27.34 kg/m²      HEENT:  Anicteric sclerae.  No oral lesions.   NECK:  No palpable left lower cervical supraclavicular adenopathy.  No palpable masses.  LUNGS:  Decreased breath sound bilateral bases.   CARDIOVASCULAR:  Regular rate and rhythm.  No any  murmurs.  ABDOMEN:  Soft, nontender, no hepatosplenomegaly.  Bowel sounds are normal.  No abnormal masses are palpable.  EXTREMITIES:bipedal pitting edema.  NEUROLOGIC:  No focal deficit.  LYMPHATICS:  No cervical, supraclavicular or axillary lymphadenopathy.    LABORATORY STUDIES:  Recent Labs   Lab 10/06/20  1102 09/29/20  1235 09/22/20  1004 09/15/20  1054 09/08/20  1219   WBC 3.5* 3.1* 5.1 3.1* 2.2*   RBC 3.53* 3.55* 4.16 3.79* 3.68*   HGB 10.3* 10.3* 11.9* 10.9* 10.6*   HCT 32.8* 32.9* 38.1 35.4* 34.4*   MCV 92.9 92.7 91.6 93.4 93.5    123* 150 164 244   Absolute Neutrophils 1.8 1.8 3.4 1.7* 0.9*   Absolute Lymphocytes 1.2 0.9* 1.2 1.0 0.9*   Absolute Monocytes 0.4 0.4 0.4 0.3 0.4   Absolute Eosinophils  0.1 0.0* 0.1 0.1 0.0*   Absolute Basophils 0.0 0.0 0.0 0.1 0.0     Recent Labs   Lab 09/22/20  1004 09/15/20  1054 08/31/20  1147 08/24/20  1204 08/20/20  0537  08/17/20  1326   Glucose 93 107* 136* 126* 94   < > 85   Sodium 142 143 134* 137 142   < > 137   Potassium 3.9 4.0 3.6 3.8 3.5   < > 2.6*   Chloride 106 107 95* 100 107   < > 97*   BUN 11 7 8 8 3*   < > 5*   Creatinine 0.73 0.78 0.70 0.69 0.59   < > 0.68   Calcium 9.2 8.6 8.7 8.5 8.4   < > 9.0   Magnesium 1.8 1.7 1.3* 1.4* 1.9   < > 1.8   Protein, Total 7.2 6.6 7.1 6.9  --   --  7.1   Albumin 3.7 3.2* 3.5* 3.2*  --   --  3.2*   GOT/AST 13 18 18 24  --   --  28   Alkaline Phosphatase 80 81 103 92  --   --  110   GPT 18 19 21 17  --   --  22    < > = values in this interval not displayed.     Recent Labs   Lab 09/22/20  1004 09/15/20  1054 08/31/20  1147 08/24/20  1204 08/20/20  0537  08/17/20  1326 08/03/20  1005  07/07/20  1554 07/03/20  1446  06/23/20  0634   Anion Gap 13 13 12 13 9*   < > 8* 12   < > 13 10   < >  --    Globulin 3.5 3.4 3.6 3.7  --   --  3.9 3.6   < > 4.4* 4.0  --   --    LD, Total  --   --   --  221  --   --   --  249*  --  228 211  --  182   Bilirubin, Total 0.8 0.5 0.7 0.6  --   --  0.7 0.4   < > 0.5 0.4  --   --     < > =  values in this interval not displayed.     Component      Latest Ref Rng & Units 6/23/2020   CEA      0.0 - 5.0 ng/mL 35.6 (H)     Addendum: IHC Prognostic Marker Result   PD-L1 22C3 (KEYTRUDA) Non-Small Cell Lung Cancer Immunohistochemical Staining Results:   Block: A2     NO EXPRESSION - Tumor Proportion Score (TPS): 0%  Intensity: 0     Reference Ranges:  High Expression >/= 50% TPS  Expressed 1-49% TPS  No Expression <1% TPS   Pathologic Diagnosis :     NGS - LUNG FUSION PANEL         Surgical Case: [id number], A2     Specimen type: Pericardial fluid, cytology.     Percent tumor: 60%     Submitted Histological Diagnosis: Poorly differentiated non-small cell     carcinoma with squamous differentiation.     Procedural notes: NA         Result of fusion analysis: NOT Detected         (Reference range:  Not Detected)         Genes tested:  ALK, ROS1, RET, NTRK1, NTRK3, FGFR1, FGFR2, FGFR3, PDGFRA, NIYA,     NRGI and MET (exon 14 splice)         (Reference range:  Not Detected)       ASSESSMENT/PLAN: 64 year lady from hematologic/oncologic standpoint has:      1. Malignant pericardial effusion. Squamous differntiation. PD-L1 0%  -lung primary  - lung fusion markers negative 6/18/2020  -left supraclavicular LN+ and if need core biopsy/further studies  -stage IV  -taxol/carboplatin/pembrolizumab per [facility]IA  -however, given her co-morbities, receiving weekly treatment 2 weeks on and 1 week off.  -will proceed with 5th cycle from today and re-eval 3 weeks with CT c/a/p which is still due.      2. H/o  HCV Cirrhosis/portal HTN/esophageal and gastric varices (EGD 9/2018).       3. H/o HCV genotype 3 s/p Sofosbuvir / Ribavirin with achieved SVR.       4. H/o HTN     5. h/o RAD/SOB eval ongoing  -s/p thoracentesis.      6. H/o GERD     7. h/o depression     8. H/o chronic low back pain.    Counseling and coordination:  Discussed findings and further recommendations with patient and answered questions.                 DISPLAY PLAN FREE TEXT

## 2021-05-28 NOTE — DIETITIAN INITIAL EVALUATION ADULT. - PERTINENT MEDS FT
MEDICATIONS  (STANDING):  ascorbic acid 2000 milliGRAM(s) Oral daily  cholecalciferol 5000 Unit(s) Oral daily  dexAMETHasone  Injectable 6 milliGRAM(s) IV Push daily  enoxaparin Injectable 40 milliGRAM(s) SubCutaneous daily  famotidine    Tablet 20 milliGRAM(s) Oral two times a day  montelukast 10 milliGRAM(s) Oral every 24 hours  zinc sulfate 220 milliGRAM(s) Oral daily    MEDICATIONS  (PRN):  acetaminophen   Tablet .. 650 milliGRAM(s) Oral every 6 hours PRN Temp greater or equal to 38C (100.4F), Mild Pain (1 - 3)

## 2021-05-28 NOTE — DISCHARGE NOTE PROVIDER - NSDCCAREPROVSEEN_GEN_ALL_CORE_FT
Cory, Mohsena F Haghighi, Farzad Cory, Mohsena F Haghighi, Mikael Voss, Alyssia Arango, Sunni Benson, Fox Cormier, Vinod Asher, Celina Moulton, Salvador Ray, Mohsena F Haghighi, Mikael

## 2021-05-28 NOTE — DISCHARGE NOTE PROVIDER - NSDCFUADDAPPT_GEN_ALL_CORE_FT
[facility]  telehealth appointment.  Appointment: Friday, June 11th at 10:00am w/ provider  Yoni RUSSO) affiliated w/ [facility].

## 2021-05-28 NOTE — DIETITIAN INITIAL EVALUATION ADULT. - OTHER INFO
Patient from home, on airborne precaution, in isolation, COVID+, unable to see, observed through glass door, pt. asleep, d/w PCA/nursing, reports of "fair" po intake, consuming 40-50% of meals depending on "her medical condition", no reports of GI distress, encourage po intake, on nasal cannula, followed by Pulmonary/ID team, skin intact, no edema.

## 2021-05-28 NOTE — DISCHARGE NOTE PROVIDER - NSDCCPCAREPLAN_GEN_ALL_CORE_FT
PRINCIPAL DISCHARGE DIAGNOSIS  Diagnosis: COVID-19 virus infection  Assessment and Plan of Treatment: You came in with diffuse body aches and difficulty breathing for 8 days. You were admitted for acute hypoxic respiratory failure due to COVID infection. On arrival your oxygen was 80% on room air and you were transitioned to 2 Liters oxygen via nasal cannula. You didn't take covid vaccine and refused vaccination. Your COVID PCR test was + positive. You were admitted for Acute Hypoxemic Respiratory Failure secondary to COVID pneumonia. Your Chest X-Ray showed bilateral infiltrates worse on the left versus the right. You were started on Dexamethasone steroid medication and Remdesivir on 5/24. As your COVID Antibody test resulted as high positive so Remdesivir medication was discontinued on 5/25 as a positive antibody test excludes your from taking this medication. Your Blood Cultures were negative, repeat viral panel was positive for COVID on repeat, no other virus was positive. We tapered down from 5L NC to 2L NC. You were working with physical therapy to get out of bed to chair. You were determined to have no needs on discharge. Repeat Chest X-Ray on 5/27 showed interval clearing of your right lung base. We plan to discharge you home with oxygen. Please continue steroid medication till - - -. We are sending you home on 30 days of aspirin - - - . Please follow up as outpatient with your primary care physician.       PRINCIPAL DISCHARGE DIAGNOSIS  Diagnosis: COVID-19 virus infection  Assessment and Plan of Treatment: You came in with diffuse body aches and difficulty breathing for 8 days. You were admitted for acute hypoxic respiratory failure due to COVID infection. On arrival your oxygen was 80% on room air and you were transitioned to 2 Liters oxygen via nasal cannula. You didn't take covid vaccine and refused vaccination. Your COVID PCR test was + positive. You were admitted for Acute Hypoxemic Respiratory Failure secondary to COVID pneumonia. Your Chest X-Ray showed bilateral infiltrates worse on the left versus the right. You were started on Dexamethasone steroid medication and Remdesivir on 5/24. As your COVID Antibody test resulted as high positive so Remdesivir medication was discontinued on 5/25 as a positive antibody test excludes your from taking this medication. Your Blood Cultures were negative, repeat viral panel was positive for COVID on repeat, no other virus was positive. We tapered down from 5L NC to 2L NC then to room air. You were working with physical therapy to get out of bed to chair. You were determined to have no needs on discharge. Repeat Chest X-Ray on 5/27 showed interval clearing of your right lung base. We are sending you home on 30 days of aspirin. Please follow up as outpatient with your primary care physician.      SECONDARY DISCHARGE DIAGNOSES  Diagnosis: COVID-19  Assessment and Plan of Treatment: CORONAVIRUS INSTRUCTIONS:   Based on your current clinical status and stability, it has been determined that you no longer need hospitalization and can recover while remaining in self-quarantine at home. You should follow the prevention steps below until a healthcare provider or local or state health department says you can return to your normal activities.   1. You should restrict activities outside your home, except for getting medical care.   2. Do not go to work, school, or public areas.   3. Avoid using public transportation, ride-sharing, or taxis.   4. Separate yourself from other people and animals in your home as much as possible.  When you are around other people (e.g., sharing a room or vehicle) you should wear a facemask.  5. Wash your hands often with soap and water for at least 20 seconds, especially after blowing your nose, coughing, or sneezing; going to the bathroom; and before eating or preparing food.  6. Cover your mouth and nose with a tissue when you cough or sneeze. Throw used tissues in a lined trash can. Immediately wash your hands with soap and water for at least 20 seconds  7. High touch surfaces include counters, tabletops, doorknobs, bathroom fixtures, toilets, phones, keyboards, tablets, and bedside tables.  8. Avoid sharing dishes, drinking glasses, cups, eating utensils, towels, or bedding with other people or pets in your home. After using these items, they should be washed thoroughly with soap and water.  You are strongly advised to seek prompt medical attention if your illness worsens or you develop new symptoms like fever or difficulty breathing.     PRINCIPAL DISCHARGE DIAGNOSIS  Diagnosis: COVID-19 virus infection  Assessment and Plan of Treatment: You came in with diffuse body aches and difficulty breathing for 8 days. You were admitted for acute hypoxic respiratory failure due to COVID infection. On arrival your oxygen was 80% on room air and you were transitioned to 2 Liters oxygen via nasal cannula. You didn't take covid vaccine and refused vaccination. Your COVID PCR test was + positive. You were admitted for Acute Hypoxemic Respiratory Failure secondary to COVID pneumonia. Your Chest X-Ray showed bilateral infiltrates worse on the left versus the right. You were started on Dexamethasone steroid medication and Remdesivir on 5/24. As your COVID Antibody test resulted as high positive so Remdesivir medication was discontinued on 5/25 as a positive antibody test excludes your from taking this medication. Your Blood Cultures were negative, repeat viral panel was positive for COVID on repeat, no other virus was positive. We tapered down from 5L NC to 2L NC then to room air. You were working with physical therapy to get out of bed to chair. You were determined to have no needs on discharge. Repeat Chest X-Ray on 5/27 showed interval clearing of your right lung base. You should buy Aspirin 81mg, Famotidine 20mg and Vitamin D 1K units over the counter and take for the next month. Make sure you Follow up with the tele health appointment made for you with physicians at [facility]. I have also provided the information of your Primary care clinic for reference.      SECONDARY DISCHARGE DIAGNOSES  Diagnosis: COVID-19  Assessment and Plan of Treatment: CORONAVIRUS INSTRUCTIONS:   Based on your current clinical status and stability, it has been determined that you no longer need hospitalization and can recover while remaining in self-quarantine at home. You should follow the prevention steps below until a healthcare provider or local or state health department says you can return to your normal activities.   1. You should restrict activities outside your home, except for getting medical care.   2. Do not go to work, school, or public areas.   3. Avoid using public transportation, ride-sharing, or taxis.   4. Separate yourself from other people and animals in your home as much as possible.  When you are around other people (e.g., sharing a room or vehicle) you should wear a facemask.  5. Wash your hands often with soap and water for at least 20 seconds, especially after blowing your nose, coughing, or sneezing; going to the bathroom; and before eating or preparing food.  6. Cover your mouth and nose with a tissue when you cough or sneeze. Throw used tissues in a lined trash can. Immediately wash your hands with soap and water for at least 20 seconds  7. High touch surfaces include counters, tabletops, doorknobs, bathroom fixtures, toilets, phones, keyboards, tablets, and bedside tables.  8. Avoid sharing dishes, drinking glasses, cups, eating utensils, towels, or bedding with other people or pets in your home. After using these items, they should be washed thoroughly with soap and water.  You are strongly advised to seek prompt medical attention if your illness worsens or you develop new symptoms like fever or difficulty breathing.    Diagnosis: Vitamin D insufficiency  Assessment and Plan of Treatment: Your Vitamin D level was low on your admission. You were given supplementation while in the hospital. You should buy Vitamin D 1K units over the counter and take it for the next month. Make sure that your primary care physician checks  your level after 1-2 months to monitor and adjust your medications.

## 2021-05-28 NOTE — DISCHARGE NOTE PROVIDER - CARE PROVIDER_API CALL
Mikael Sánchez)  Medicine  [address] [place] [address]  Phone: [phone]  Fax: [phone]  Follow Up Time:

## 2021-05-28 NOTE — PROGRESS NOTE ADULT - SUBJECTIVE AND OBJECTIVE BOX
Patient is seen and examined at the bed side, is afebrile. She is doing much better. The oxygen requirement came down to 1 liter. The WBC count trending down to normal.       REVIEW OF SYSTEMS: All other review systems are negative      ALLERGIES: No Known Allergies    Vital Signs Last 24 Hrs  T(C): 36.4 (28 May 2021 13:27), Max: 36.6 (28 May 2021 05:23)  T(F): 97.5 (28 May 2021 13:27), Max: 97.8 (28 May 2021 05:23)  HR: 71 (28 May 2021 13:27) (71 - 88)  BP: 118/45 (28 May 2021 13:27) (118/45 - 140/77)  BP(mean): --  RR: 18 (28 May 2021 13:27) (17 - 18)  SpO2: 94% (28 May 2021 13:27) (94% - 95%)      PHYSICAL EXAM:  GENERAL: Not in distress, on oxygen via NC  CHEST/LUNG: Not using accessory muscles  HEART: s1 and s2 present  ABDOMEN:  Nontender and  Nondistended  EXTREMITIES: No pedal  edema  CNS: Awake and Alert      LABS:                          11.6   8.78  )-----------( 725      ( 28 May 2021 07:36 )             34.3                           12.5   13.72 )-----------( 792      ( 27 May 2021 08:05 )             37.0                           11.8   12.75 )-----------( 728      ( 26 May 2021 08:20 )             35.1         05-28    138  |  106  |  20<H>  ----------------------------<  90  4.1   |  27  |  0.48<L>    Ca    8.8      28 May 2021 07:36  Phos  3.4     05-28  Mg     2.1     05-28    TPro  6.2  /  Alb  2.1<L>  /  TBili  0.5  /  DBili  x   /  AST  27  /  ALT  37  /  AlkPhos  64  05-28 05-27    140  |  109<H>  |  16  ----------------------------<  108<H>  4.1   |  22  |  0.48<L>    Ca    8.5      27 May 2021 08:05  Phos  2.8     05-27  Mg     2.2     05-27    TPro  6.8  /  Alb  2.0<L>  /  TBili  0.6  /  DBili  x   /  AST  43<H>  /  ALT  43  /  AlkPhos  75  05-27    Procalcitonin, Serum (05.24.21 @ 22:01)  : 0.10  C-Reactive Protein, Serum (05.23.21 @ 23:27) : 144      MEDICATIONS  (STANDING):    ascorbic acid 2000 milliGRAM(s) Oral daily  cholecalciferol 5000 Unit(s) Oral daily  dexAMETHasone  Injectable 6 milliGRAM(s) IV Push daily  enoxaparin Injectable 40 milliGRAM(s) SubCutaneous daily  famotidine    Tablet 20 milliGRAM(s) Oral two times a day  montelukast 10 milliGRAM(s) Oral every 24 hours  zinc sulfate 220 milliGRAM(s) Oral daily        RADIOLOGY & ADDITIONAL TESTS:    5/23/21 : Xray Chest 1 View- PORTABLE-Urgent (05.23.21 @ 15:08) : Bilateral infiltrates left greater than right.      MICROBIOLOGY DATA:    Respiratory Viral Panel with COVID-19 by KATLYN (05.25.21 @ 09:52)   Rapid RVP Result: Detected   SARS-CoV-2: Detected    COVID-19 Yossi Domain Antibody (05.24.21 @ 22:04)   COVID-19 Yossi Domain Antibody Result: 222.00    Culture - Blood (05.23.21 @ 18:46)   Specimen Source: .Blood Blood-Peripheral   Culture Results: No growth to date.     Culture - Blood (05.23.21 @ 18:46)   Specimen Source: .Blood Blood-Peripheral   Culture Results: No growth to date.     COVID-19 PCR . (05.23.21 @ 15:27)   COVID-19 PCR: Detected           Patient is seen and examined at the bed side, is afebrile. She is doing much better. The oxygen requirement came down to 1 liter. The WBC count trended down to normal.       REVIEW OF SYSTEMS: All other review systems are negative      ALLERGIES: No Known Allergies      Vital Signs Last 24 Hrs  T(C): 36.4 (28 May 2021 13:27), Max: 36.6 (28 May 2021 05:23)  T(F): 97.5 (28 May 2021 13:27), Max: 97.8 (28 May 2021 05:23)  HR: 71 (28 May 2021 13:27) (71 - 88)  BP: 118/45 (28 May 2021 13:27) (118/45 - 140/77)  BP(mean): --  RR: 18 (28 May 2021 13:27) (17 - 18)  SpO2: 94% (28 May 2021 13:27) (94% - 95%)      PHYSICAL EXAM:  GENERAL: Not in distress, on oxygen via NC  CHEST/LUNG: Not using accessory muscles  HEART: s1 and s2 present  ABDOMEN:  Nontender and  Nondistended  EXTREMITIES: No pedal  edema  CNS: Awake and Alert      LABS:                          11.6   8.78  )-----------( 725      ( 28 May 2021 07:36 )             34.3                           12.5   13.72 )-----------( 792      ( 27 May 2021 08:05 )             37.0                           11.8   12.75 )-----------( 728      ( 26 May 2021 08:20 )             35.1         05-28    138  |  106  |  20<H>  ----------------------------<  90  4.1   |  27  |  0.48<L>    Ca    8.8      28 May 2021 07:36  Phos  3.4     05-28  Mg     2.1     05-28    TPro  6.2  /  Alb  2.1<L>  /  TBili  0.5  /  DBili  x   /  AST  27  /  ALT  37  /  AlkPhos  64  05-28 05-27    140  |  109<H>  |  16  ----------------------------<  108<H>  4.1   |  22  |  0.48<L>    Ca    8.5      27 May 2021 08:05  Phos  2.8     05-27  Mg     2.2     05-27    TPro  6.8  /  Alb  2.0<L>  /  TBili  0.6  /  DBili  x   /  AST  43<H>  /  ALT  43  /  AlkPhos  75  05-27    Procalcitonin, Serum (05.24.21 @ 22:01)  : 0.10  C-Reactive Protein, Serum (05.23.21 @ 23:27) : 144      MEDICATIONS  (STANDING):    ascorbic acid 2000 milliGRAM(s) Oral daily  cholecalciferol 5000 Unit(s) Oral daily  dexAMETHasone  Injectable 6 milliGRAM(s) IV Push daily  enoxaparin Injectable 40 milliGRAM(s) SubCutaneous daily  famotidine    Tablet 20 milliGRAM(s) Oral two times a day  montelukast 10 milliGRAM(s) Oral every 24 hours  zinc sulfate 220 milliGRAM(s) Oral daily        RADIOLOGY & ADDITIONAL TESTS:    5/23/21 : Xray Chest 1 View- PORTABLE-Urgent (05.23.21 @ 15:08) : Bilateral infiltrates left greater than right.      MICROBIOLOGY DATA:    Respiratory Viral Panel with COVID-19 by KATLYN (05.25.21 @ 09:52)   Rapid RVP Result: Detected   SARS-CoV-2: Detected    COVID-19 Yossi Domain Antibody (05.24.21 @ 22:04)   COVID-19 Yossi Domain Antibody Result: 222.00    Culture - Blood (05.23.21 @ 18:46)   Specimen Source: .Blood Blood-Peripheral   Culture Results: No growth to date.     Culture - Blood (05.23.21 @ 18:46)   Specimen Source: .Blood Blood-Peripheral   Culture Results: No growth to date.     COVID-19 PCR . (05.23.21 @ 15:27)   COVID-19 PCR: Detected

## 2021-05-28 NOTE — PROGRESS NOTE ADULT - PROBLEM SELECTOR PLAN 2
Elevated inflammatory markers like ESR, CRP, Ferritin.   - Remdes 5/24-25 DC as COVID Ab positive  - Dexa 5/24 - 06/1  c/w vit C, zinc, vit D per attending  Encourage prone positioning.  - RVP POSITIVE for COVID on repeat  management as above

## 2021-05-28 NOTE — DISCHARGE NOTE NURSING/CASE MANAGEMENT/SOCIAL WORK - PATIENT PORTAL LINK FT
You can access the FollowMyHealth Patient Portal offered by [facility] by registering at the following website: [email or web address]. By joining [facility]’s FollowMyHealth portal, you will also be able to view your health information using other applications (apps) compatible with our system.

## 2021-05-28 NOTE — PROGRESS NOTE ADULT - SUBJECTIVE AND OBJECTIVE BOX
PGY-1 Progress Note discussed with attending    PAGER #: [[phone]] TILL 5:00 PM  PLEASE CONTACT ON CALL TEAM:   - On Call Team (Please refer to Shanda) FROM 5:00 PM - 8:30PM  - Nightfloat Team FROM 8:30 -7:30 AM    CHIEF COMPLAINT & BRIEF HOSPITAL COURSE:  70 y/o female with no significant pmhx presents to ED with c/o body ache and difficulty breathing x 8 days. Patient notes  is admitted to ICU in [place] for Covid. Pt was admitted for acute hypoxic respiratory failure due to COVID.   On arrival noted 80% on RA, now on NC3L 96%. Pt didn't take covid vaccine. taking no meds at home. COVID PCR + positive. Admitted for AHRF 2/2 COVID PNA. CXR showed b/l infiltrates L>>R. Started on Dexamethason and Remdesivir on 5/24. COVID Ab resulted high positive, Remdesivir was discontinued on 5/25. BCx NGTD. RVP positive COVID. BCx NGTD. Tapered down from 5L NC to 2L NC. Working with PT OOBTC.     INTERVAL HPI/OVERNIGHT EVENTS:   Patient reports improvement in symptoms. CXR with interval clearing of right lung base. No overnight events. PT eval pending. VSS, no fevers. Markers stable.    REVIEW OF SYSTEMS:  CONSTITUTIONAL: No fever, weight loss, or fatigue + malaise  RESPIRATORY: + cough, wheezing, chills or hemoptysis; No shortness of breath  CARDIOVASCULAR: No chest pain, palpitations, dizziness, or leg swelling  GASTROINTESTINAL: No abdominal pain. No nausea, vomiting, or hematemesis; No diarrhea or constipation. No melena or hematochezia.  GENITOURINARY: No dysuria or hematuria, urinary frequency  NEUROLOGICAL: No headaches, memory loss, loss of strength, numbness, or tremors  SKIN: No itching, burning, rashes, or lesions     MEDICATIONS  (STANDING):  ascorbic acid 2000 milliGRAM(s) Oral daily  cholecalciferol 5000 Unit(s) Oral daily  dexAMETHasone  Injectable 6 milliGRAM(s) IV Push daily  enoxaparin Injectable 40 milliGRAM(s) SubCutaneous daily  famotidine    Tablet 20 milliGRAM(s) Oral two times a day  montelukast 10 milliGRAM(s) Oral every 24 hours  zinc sulfate 220 milliGRAM(s) Oral daily    MEDICATIONS  (PRN):  acetaminophen   Tablet .. 650 milliGRAM(s) Oral every 6 hours PRN Temp greater or equal to 38C (100.4F), Mild Pain (1 - 3)      Vital Signs Last 24 Hrs  T(C): 36.6 (28 May 2021 05:23), Max: 36.8 (27 May 2021 14:26)  T(F): 97.8 (28 May 2021 05:23), Max: 98.2 (27 May 2021 14:26)  HR: 88 (28 May 2021 05:23) (71 - 88)  BP: 140/77 (28 May 2021 05:23) (123/44 - 140/77)  BP(mean): --  RR: 18 (28 May 2021 05:23) (16 - 18)  SpO2: 94% (28 May 2021 05:23) (94% - 95%)    PHYSICAL EXAMINATION:  GENERAL: NAD, well built,2L NC, sitting up eating breakfast  HEAD:  Atraumatic, Normocephalic  EYES:  conjunctiva and sclera clear  NECK: Supple, No JVD  CHEST/LUNG: coarse breath sounds throughout lung fields - improved.  No rales, rhonchi, wheezing, or rubs  HEART: Regular rate and rhythm; No murmurs, rubs, or gallops  ABDOMEN: Soft, Nontender, Nondistended; Bowel sounds present  NERVOUS SYSTEM:  Alert & Oriented X3,    EXTREMITIES:  2+ Peripheral Pulses, No clubbing, cyanosis, or edema  SKIN: warm dry                          11.6   8.78  )-----------( 725      ( 28 May 2021 07:36 )             34.3     05-28    138  |  106  |  20<H>  ----------------------------<  90  4.1   |  27  |  0.48<L>    Ca    8.8      28 May 2021 07:36  Phos  3.4     05-28  Mg     2.1     05-28    TPro  6.2  /  Alb  2.1<L>  /  TBili  0.5  /  DBili  x   /  AST  27  /  ALT  37  /  AlkPhos  64  05-28    LIVER FUNCTIONS - ( 28 May 2021 07:36 )  Alb: 2.1 g/dL / Pro: 6.2 g/dL / ALK PHOS: 64 U/L / ALT: 37 U/L DA / AST: 27 U/L / GGT: x           I&O's Summary    RADIOLOGY & ADDITIONAL TESTS:    < from: Xray Chest 1 View- PORTABLE-Routine (Xray Chest 1 View- PORTABLE-Routine .) (05.27.21 @ 10:07) >    EXAM:  XR CHEST PORTABLE ROUTINE 1V                            PROCEDURE DATE:  05/27/2021          INTERPRETATION:  Chest one view    HISTORY: Hypoxemia    COMPARISON STUDY: 5/23/2021    Frontal expiratory view of the chest shows the heart to be normal in size. The lungs show similar patchy infiltrates of the left lung with slight clearing of the right base and there is no evidence of pneumothorax nor pleural effusion.    IMPRESSION:  Slight clearing, right base.    Thank you for the courtesy of this referral.            ELOINA NELSON MD; Attending Interventional Radiologist  This document has been electronically signed. May 27 2021  2:59PM    < end of copied text >

## 2021-05-29 LAB
ALBUMIN SERPL ELPH-MCNC: 2.1 G/DL — LOW (ref 3.5–5)
ALP SERPL-CCNC: 74 U/L — SIGNIFICANT CHANGE UP (ref 40–120)
ALT FLD-CCNC: 36 U/L DA — SIGNIFICANT CHANGE UP (ref 10–60)
ANION GAP SERPL CALC-SCNC: 9 MMOL/L — SIGNIFICANT CHANGE UP (ref 5–17)
AST SERPL-CCNC: 21 U/L — SIGNIFICANT CHANGE UP (ref 10–40)
BASOPHILS # BLD AUTO: 0.03 K/UL — SIGNIFICANT CHANGE UP (ref 0–0.2)
BASOPHILS NFR BLD AUTO: 0.4 % — SIGNIFICANT CHANGE UP (ref 0–2)
BILIRUB SERPL-MCNC: 0.6 MG/DL — SIGNIFICANT CHANGE UP (ref 0.2–1.2)
BUN SERPL-MCNC: 18 MG/DL — SIGNIFICANT CHANGE UP (ref 7–18)
CALCIUM SERPL-MCNC: 8.8 MG/DL — SIGNIFICANT CHANGE UP (ref 8.4–10.5)
CHLORIDE SERPL-SCNC: 107 MMOL/L — SIGNIFICANT CHANGE UP (ref 96–108)
CO2 SERPL-SCNC: 24 MMOL/L — SIGNIFICANT CHANGE UP (ref 22–31)
CREAT SERPL-MCNC: 0.47 MG/DL — LOW (ref 0.5–1.3)
D DIMER BLD IA.RAPID-MCNC: 884 NG/ML DDU — HIGH
EOSINOPHIL # BLD AUTO: 0.07 K/UL — SIGNIFICANT CHANGE UP (ref 0–0.5)
EOSINOPHIL NFR BLD AUTO: 0.8 % — SIGNIFICANT CHANGE UP (ref 0–6)
FERRITIN SERPL-MCNC: 345 NG/ML — HIGH (ref 15–150)
GLUCOSE SERPL-MCNC: 93 MG/DL — SIGNIFICANT CHANGE UP (ref 70–99)
HCT VFR BLD CALC: 35.5 % — SIGNIFICANT CHANGE UP (ref 34.5–45)
HGB BLD-MCNC: 12 G/DL — SIGNIFICANT CHANGE UP (ref 11.5–15.5)
IMM GRANULOCYTES NFR BLD AUTO: 0.8 % — SIGNIFICANT CHANGE UP (ref 0–1.5)
LDH SERPL L TO P-CCNC: 205 U/L — SIGNIFICANT CHANGE UP (ref 120–225)
LYMPHOCYTES # BLD AUTO: 2.27 K/UL — SIGNIFICANT CHANGE UP (ref 1–3.3)
LYMPHOCYTES # BLD AUTO: 26.9 % — SIGNIFICANT CHANGE UP (ref 13–44)
MCHC RBC-ENTMCNC: 30.5 PG — SIGNIFICANT CHANGE UP (ref 27–34)
MCHC RBC-ENTMCNC: 33.8 GM/DL — SIGNIFICANT CHANGE UP (ref 32–36)
MCV RBC AUTO: 90.1 FL — SIGNIFICANT CHANGE UP (ref 80–100)
MONOCYTES # BLD AUTO: 0.57 K/UL — SIGNIFICANT CHANGE UP (ref 0–0.9)
MONOCYTES NFR BLD AUTO: 6.7 % — SIGNIFICANT CHANGE UP (ref 2–14)
NEUTROPHILS # BLD AUTO: 5.44 K/UL — SIGNIFICANT CHANGE UP (ref 1.8–7.4)
NEUTROPHILS NFR BLD AUTO: 64.4 % — SIGNIFICANT CHANGE UP (ref 43–77)
NRBC # BLD: 0 /100 WBCS — SIGNIFICANT CHANGE UP (ref 0–0)
PLATELET # BLD AUTO: 729 K/UL — HIGH (ref 150–400)
POTASSIUM SERPL-MCNC: 4.1 MMOL/L — SIGNIFICANT CHANGE UP (ref 3.5–5.3)
POTASSIUM SERPL-SCNC: 4.1 MMOL/L — SIGNIFICANT CHANGE UP (ref 3.5–5.3)
PROT SERPL-MCNC: 6.6 G/DL — SIGNIFICANT CHANGE UP (ref 6–8.3)
RBC # BLD: 3.94 M/UL — SIGNIFICANT CHANGE UP (ref 3.8–5.2)
RBC # FLD: 12.6 % — SIGNIFICANT CHANGE UP (ref 10.3–14.5)
SODIUM SERPL-SCNC: 140 MMOL/L — SIGNIFICANT CHANGE UP (ref 135–145)
WBC # BLD: 8.45 K/UL — SIGNIFICANT CHANGE UP (ref 3.8–10.5)
WBC # FLD AUTO: 8.45 K/UL — SIGNIFICANT CHANGE UP (ref 3.8–10.5)

## 2021-05-29 RX ADMIN — Medication 6 MILLIGRAM(S): at 06:33

## 2021-05-29 RX ADMIN — ZINC SULFATE TAB 220 MG (50 MG ZINC EQUIVALENT) 220 MILLIGRAM(S): 220 (50 ZN) TAB at 12:40

## 2021-05-29 RX ADMIN — Medication 5000 UNIT(S): at 12:40

## 2021-05-29 RX ADMIN — FAMOTIDINE 20 MILLIGRAM(S): 10 INJECTION INTRAVENOUS at 06:33

## 2021-05-29 RX ADMIN — ENOXAPARIN SODIUM 40 MILLIGRAM(S): 100 INJECTION SUBCUTANEOUS at 12:41

## 2021-05-29 RX ADMIN — FAMOTIDINE 20 MILLIGRAM(S): 10 INJECTION INTRAVENOUS at 17:26

## 2021-05-29 RX ADMIN — Medication 2000 MILLIGRAM(S): at 12:40

## 2021-05-29 RX ADMIN — MONTELUKAST 10 MILLIGRAM(S): 4 TABLET, CHEWABLE ORAL at 17:26

## 2021-05-29 NOTE — PROGRESS NOTE ADULT - ASSESSMENT
Patient is a 71y old  Female with no significant pmhx presents to the ER for evaluation of body ache, fever and difficulty breathing x 8 days. Her  is admitted to ICU in [place] for COVID. ON arrival she found to have hypoxia to 80 %, tachycardia and tachypnea. The CXR shows Bilateral infiltrates left greater than right. She has started on Remdesivir and Dexamethasone, but after 3 doses  Remdesivir ha sbeen stopped due to positive Antibody. Currently she is on Dexamethasone and Anticoagulation. The ID consult requested to assist with further management of COVID.     # COVID pneumonia  # Acute Respiratory failure- on oxygen via NC  # COVID Ab positive- 5/24/21 - ? vaccination vs prior exposure    Would recommend:    1. OOB to chair   2. Continue Dexamethasone to complete the course or until discharge   3. Continue  supportive care   4. Please wean off oxygen as tolerated   5. COVID precaution    Attending Attestation:    Spent more than 35 minutes on total encounter, more than 50 % of the visit was spent counseling and/or coordinating care by the Attending physician. Patient is a 71y old  Female with no significant pmhx presents to the ER for evaluation of body ache, fever and difficulty breathing x 8 days. Her  is admitted to ICU in [place] for COVID. ON arrival she found to have hypoxia to 80 %, tachycardia and tachypnea. The CXR shows Bilateral infiltrates left greater than right. She has started on Remdesivir and Dexamethasone, but after 3 doses  Remdesivir ha sbeen stopped due to positive Antibody. Currently she is on Dexamethasone and Anticoagulation. The ID consult requested to assist with further management of COVID.     # COVID pneumonia  # Acute Respiratory failure- on oxygen via NC  # COVID Ab positive- 5/24/21 - ? vaccination vs prior exposure    Would recommend:    1. Please wean off oxygen as tolerated    2. Continue Dexamethasone to complete the course or until discharge   3. Continue  supportive care   4. OOB to chair   5. COVID precaution    Attending Attestation:    Spent more than 35 minutes on total encounter, more than 50 % of the visit was spent counseling and/or coordinating care by the Attending physician.

## 2021-05-29 NOTE — PROGRESS NOTE ADULT - SUBJECTIVE AND OBJECTIVE BOX
PGY-1 Progress Note discussed with attending    PAGER #: [[phone]] TILL 5:00 PM  PLEASE CONTACT ON CALL TEAM:   - On Call Team (Please refer to Shanda) FROM 5:00 PM - 8:30PM  - Nightfloat Team FROM 8:30 -7:30 AM    CHIEF COMPLAINT & BRIEF HOSPITAL COURSE:  70 y/o female with no significant pmhx presents to ED with c/o body ache and difficulty breathing x 8 days. Patient notes  is admitted to ICU in [place] for Covid. Pt was admitted for acute hypoxic respiratory failure due to COVID. On arrival noted 80% on RA, now on NC3L 96%. Pt didn't take covid vaccine. taking no meds at home. COVID PCR + positive. Admitted for AHRF 2/2 COVID PNA. CXR showed b/l infiltrates L>>R. Started on Dexamethason and Remdesivir on 5/24. COVID Ab resulted high positive, Remdesivir was discontinued on 5/25. BCx NGTD. RVP positive COVID. BCx NGTD. Tapered down from 5L NC to 2L NC to 1L now. Working with PT OOBTC. CXR 5/27 with interval clearing of right lung base. No overnight events. PT eval pending. VSS, no fevers. Markers stable. Plan for discharge with home oxygen if qualifies.    INTERVAL HPI/OVERNIGHT EVENTS:   Transitioned down to 1L NC. Plan for ambu walk test. No overnight events.    REVIEW OF SYSTEMS:  CONSTITUTIONAL: No fever, weight loss, or fatigue  RESPIRATORY: + cough, wheezing, chills or hemoptysis; + shortness of breath resolving  CARDIOVASCULAR: No chest pain, palpitations, dizziness, or leg swelling  GASTROINTESTINAL: No abdominal pain. No nausea, vomiting, or hematemesis; No diarrhea or constipation. No melena or hematochezia.  GENITOURINARY: No dysuria or hematuria, urinary frequency  NEUROLOGICAL: No headaches, memory loss, loss of strength, numbness, or tremors  SKIN: No itching, burning, rashes, or lesions     MEDICATIONS  (STANDING):  ascorbic acid 2000 milliGRAM(s) Oral daily  cholecalciferol 5000 Unit(s) Oral daily  dexAMETHasone  Injectable 6 milliGRAM(s) IV Push daily  enoxaparin Injectable 40 milliGRAM(s) SubCutaneous daily  famotidine    Tablet 20 milliGRAM(s) Oral two times a day  montelukast 10 milliGRAM(s) Oral every 24 hours  zinc sulfate 220 milliGRAM(s) Oral daily    MEDICATIONS  (PRN):  acetaminophen   Tablet .. 650 milliGRAM(s) Oral every 6 hours PRN Temp greater or equal to 38C (100.4F), Mild Pain (1 - 3)      Vital Signs Last 24 Hrs  T(C): 37 (29 May 2021 05:38), Max: 37.2 (28 May 2021 21:42)  T(F): 98.6 (29 May 2021 05:38), Max: 99 (28 May 2021 21:42)  HR: 67 (29 May 2021 05:38) (65 - 71)  BP: 136/41 (29 May 2021 05:38) (118/45 - 136/41)  BP(mean): --  RR: 18 (29 May 2021 05:38) (18 - 18)  SpO2: 97% (29 May 2021 05:38) (94% - 97%)    PHYSICAL EXAMINATION:  GENERAL: NAD, well built,1L NC, sitting up eating breakfast  HEAD:  Atraumatic, Normocephalic  EYES:  conjunctiva and sclera clear  NECK: Supple, No JVD  CHEST/LUNG: coarse breath sounds throughout lung fields - improved.  No rales, rhonchi, wheezing, or rubs  HEART: Regular rate and rhythm; No murmurs, rubs, or gallops  ABDOMEN: Soft, Nontender, Nondistended; Bowel sounds present  NERVOUS SYSTEM:  Alert & Oriented X3,    EXTREMITIES:  2+ Peripheral Pulses, No clubbing, cyanosis, or edema  SKIN: warm dry                          12.0   8.45  )-----------( 729      ( 29 May 2021 07:48 )             35.5     05-29    140  |  107  |  18  ----------------------------<  93  4.1   |  24  |  0.47<L>    Ca    8.8      29 May 2021 07:48  Phos  3.4     05-28  Mg     2.1     05-28    TPro  6.6  /  Alb  2.1<L>  /  TBili  0.6  /  DBili  x   /  AST  21  /  ALT  36  /  AlkPhos  74  05-29    LIVER FUNCTIONS - ( 29 May 2021 07:48 )  Alb: 2.1 g/dL / Pro: 6.6 g/dL / ALK PHOS: 74 U/L / ALT: 36 U/L DA / AST: 21 U/L / GGT: x

## 2021-05-29 NOTE — PROGRESS NOTE ADULT - PROBLEM SELECTOR PLAN 1
Now on NC 2L now on 1L NC spO2: 94%- 95% > will trial to taper OFF O2 today  likely due to covid PNA  Less likely PE in the setting of normal D dimer and no tachycardia  CXR showed b/l infiltrates left more than right.  c/w oxygen supplementation with spO2> 94%  monitor respiratory status  trend inflammatory markers  - RVP POSITIVE for COVID on repeat  - Remdes 5/24-25 DC as COVID Ab positive  - Dexa 5/24 - 06/1  - CXR on 05/27 shows interval clearing of right lung base  - taper O2  - PT eval  - OOBTC  - ambu O2 test

## 2021-05-29 NOTE — PROGRESS NOTE ADULT - ASSESSMENT
72 y/o female with no significant pmhx presents to ED with c/o body ache and difficulty breathing x 8 days. Patient notes  is admitted to ICU in [place] for Covid. Pt was admitted for acute hypoxic respiratory failure due to COVID.   On arrival noted 80% on RA, now on NC3L 96%. Pt didn't take covid vaccine. taking no meds at home.     no wheezing

## 2021-05-29 NOTE — PROGRESS NOTE ADULT - SUBJECTIVE AND OBJECTIVE BOX
Patient is seen and examined at the bed side, is afebrile. She is doing much better. The oxygen requirement came down to 1 liter. The WBC count trended down to normal.       REVIEW OF SYSTEMS: All other review systems are negative      ALLERGIES: No Known Allergies      Vital Signs Last 24 Hrs  T(C): 37.1 (29 May 2021 14:00), Max: 37.2 (28 May 2021 21:42)  T(F): 98.7 (29 May 2021 14:00), Max: 99 (28 May 2021 21:42)  HR: 59 (29 May 2021 14:00) (59 - 67)  BP: 129/59 (29 May 2021 14:00) (129/59 - 136/41)  BP(mean): --  RR: 18 (29 May 2021 14:00) (18 - 18)  SpO2: 96% (29 May 2021 14:00) (96% - 97%)      PHYSICAL EXAM:  GENERAL: Not in distress, on oxygen via NC  CHEST/LUNG: Not using accessory muscles  HEART: s1 and s2 present  ABDOMEN:  Nontender and  Nondistended  EXTREMITIES: No pedal  edema  CNS: Awake and Alert      LABS:                          12.0   8.45  )-----------( 729      ( 29 May 2021 07:48 )             35.5                           11.6   8.78  )-----------( 725      ( 28 May 2021 07:36 )             34.3                           12.5   13.72 )-----------( 792      ( 27 May 2021 08:05 )             37.0       05-29    140  |  107  |  18  ----------------------------<  93  4.1   |  24  |  0.47<L>    Ca    8.8      29 May 2021 07:48  Phos  3.4     05-28  Mg     2.1     05-28    TPro  6.6  /  Alb  2.1<L>  /  TBili  0.6  /  DBili  x   /  AST  21  /  ALT  36  /  AlkPhos  74  05-29 05-28    138  |  106  |  20<H>  ----------------------------<  90  4.1   |  27  |  0.48<L>    Ca    8.8      28 May 2021 07:36  Phos  3.4     05-28  Mg     2.1     05-28    TPro  6.2  /  Alb  2.1<L>  /  TBili  0.5  /  DBili  x   /  AST  27  /  ALT  37  /  AlkPhos  64  05-28    Procalcitonin, Serum (05.24.21 @ 22:01)  : 0.10  C-Reactive Protein, Serum (05.23.21 @ 23:27) : 144      MEDICATIONS  (STANDING):    ascorbic acid 2000 milliGRAM(s) Oral daily  cholecalciferol 5000 Unit(s) Oral daily  dexAMETHasone  Injectable 6 milliGRAM(s) IV Push daily  enoxaparin Injectable 40 milliGRAM(s) SubCutaneous daily  famotidine    Tablet 20 milliGRAM(s) Oral two times a day  montelukast 10 milliGRAM(s) Oral every 24 hours  zinc sulfate 220 milliGRAM(s) Oral daily        RADIOLOGY & ADDITIONAL TESTS:    5/23/21 : Xray Chest 1 View- PORTABLE-Urgent (05.23.21 @ 15:08) : Bilateral infiltrates left greater than right.      MICROBIOLOGY DATA:    Respiratory Viral Panel with COVID-19 by KATLYN (05.25.21 @ 09:52)   Rapid RVP Result: Detected   SARS-CoV-2: Detected    COVID-19 Yossi Domain Antibody (05.24.21 @ 22:04)   COVID-19 Yossi Domain Antibody Result: 222.00    Culture - Blood (05.23.21 @ 18:46)   Specimen Source: .Blood Blood-Peripheral   Culture Results: No growth to date.     Culture - Blood (05.23.21 @ 18:46)   Specimen Source: .Blood Blood-Peripheral   Culture Results: No growth to date.     COVID-19 PCR . (05.23.21 @ 15:27)   COVID-19 PCR: Detected             Patient is seen and examined at the bed side, is afebrile. She remains on 1 liter oxygen via NC.  The WBC count stay normal.       REVIEW OF SYSTEMS: All other review systems are negative      ALLERGIES: No Known Allergies      Vital Signs Last 24 Hrs  T(C): 37.1 (29 May 2021 14:00), Max: 37.2 (28 May 2021 21:42)  T(F): 98.7 (29 May 2021 14:00), Max: 99 (28 May 2021 21:42)  HR: 59 (29 May 2021 14:00) (59 - 67)  BP: 129/59 (29 May 2021 14:00) (129/59 - 136/41)  BP(mean): --  RR: 18 (29 May 2021 14:00) (18 - 18)  SpO2: 96% (29 May 2021 14:00) (96% - 97%)      PHYSICAL EXAM:  GENERAL: Not in distress, on oxygen via NC  CHEST/LUNG: Not using accessory muscles  HEART: s1 and s2 present  ABDOMEN:  Nontender and  Nondistended  EXTREMITIES: No pedal  edema  CNS: Awake and Alert      LABS:                          12.0   8.45  )-----------( 729      ( 29 May 2021 07:48 )             35.5                           11.6   8.78  )-----------( 725      ( 28 May 2021 07:36 )             34.3                           12.5   13.72 )-----------( 792      ( 27 May 2021 08:05 )             37.0       05-29    140  |  107  |  18  ----------------------------<  93  4.1   |  24  |  0.47<L>    Ca    8.8      29 May 2021 07:48  Phos  3.4     05-28  Mg     2.1     05-28    TPro  6.6  /  Alb  2.1<L>  /  TBili  0.6  /  DBili  x   /  AST  21  /  ALT  36  /  AlkPhos  74  05-29 05-28    138  |  106  |  20<H>  ----------------------------<  90  4.1   |  27  |  0.48<L>    Ca    8.8      28 May 2021 07:36  Phos  3.4     05-28  Mg     2.1     05-28    TPro  6.2  /  Alb  2.1<L>  /  TBili  0.5  /  DBili  x   /  AST  27  /  ALT  37  /  AlkPhos  64  05-28    Procalcitonin, Serum (05.24.21 @ 22:01)  : 0.10  C-Reactive Protein, Serum (05.23.21 @ 23:27) : 144      MEDICATIONS  (STANDING):    ascorbic acid 2000 milliGRAM(s) Oral daily  cholecalciferol 5000 Unit(s) Oral daily  dexAMETHasone  Injectable 6 milliGRAM(s) IV Push daily  enoxaparin Injectable 40 milliGRAM(s) SubCutaneous daily  famotidine    Tablet 20 milliGRAM(s) Oral two times a day  montelukast 10 milliGRAM(s) Oral every 24 hours  zinc sulfate 220 milliGRAM(s) Oral daily        RADIOLOGY & ADDITIONAL TESTS:    5/23/21 : Xray Chest 1 View- PORTABLE-Urgent (05.23.21 @ 15:08) : Bilateral infiltrates left greater than right.      MICROBIOLOGY DATA:    Respiratory Viral Panel with COVID-19 by KATLYN (05.25.21 @ 09:52)   Rapid RVP Result: Detected   SARS-CoV-2: Detected    COVID-19 Yossi Domain Antibody (05.24.21 @ 22:04)   COVID-19 Yossi Domain Antibody Result: 222.00    Culture - Blood (05.23.21 @ 18:46)   Specimen Source: .Blood Blood-Peripheral   Culture Results: No growth to date.     Culture - Blood (05.23.21 @ 18:46)   Specimen Source: .Blood Blood-Peripheral   Culture Results: No growth to date.     COVID-19 PCR . (05.23.21 @ 15:27)   COVID-19 PCR: Detected

## 2021-05-30 LAB
ALBUMIN SERPL ELPH-MCNC: 2.1 G/DL — LOW (ref 3.5–5)
ALP SERPL-CCNC: 74 U/L — SIGNIFICANT CHANGE UP (ref 40–120)
ALT FLD-CCNC: 38 U/L DA — SIGNIFICANT CHANGE UP (ref 10–60)
ANION GAP SERPL CALC-SCNC: 9 MMOL/L — SIGNIFICANT CHANGE UP (ref 5–17)
AST SERPL-CCNC: 23 U/L — SIGNIFICANT CHANGE UP (ref 10–40)
BASOPHILS # BLD AUTO: 0.02 K/UL — SIGNIFICANT CHANGE UP (ref 0–0.2)
BASOPHILS NFR BLD AUTO: 0.3 % — SIGNIFICANT CHANGE UP (ref 0–2)
BILIRUB SERPL-MCNC: 0.6 MG/DL — SIGNIFICANT CHANGE UP (ref 0.2–1.2)
BUN SERPL-MCNC: 20 MG/DL — HIGH (ref 7–18)
CALCIUM SERPL-MCNC: 8.6 MG/DL — SIGNIFICANT CHANGE UP (ref 8.4–10.5)
CHLORIDE SERPL-SCNC: 107 MMOL/L — SIGNIFICANT CHANGE UP (ref 96–108)
CO2 SERPL-SCNC: 25 MMOL/L — SIGNIFICANT CHANGE UP (ref 22–31)
CREAT SERPL-MCNC: 0.52 MG/DL — SIGNIFICANT CHANGE UP (ref 0.5–1.3)
D DIMER BLD IA.RAPID-MCNC: 645 NG/ML DDU — HIGH
EOSINOPHIL # BLD AUTO: 0.04 K/UL — SIGNIFICANT CHANGE UP (ref 0–0.5)
EOSINOPHIL NFR BLD AUTO: 0.6 % — SIGNIFICANT CHANGE UP (ref 0–6)
FERRITIN SERPL-MCNC: 327 NG/ML — HIGH (ref 15–150)
GLUCOSE SERPL-MCNC: 87 MG/DL — SIGNIFICANT CHANGE UP (ref 70–99)
HCT VFR BLD CALC: 34.4 % — LOW (ref 34.5–45)
HGB BLD-MCNC: 11.5 G/DL — SIGNIFICANT CHANGE UP (ref 11.5–15.5)
IMM GRANULOCYTES NFR BLD AUTO: 0.7 % — SIGNIFICANT CHANGE UP (ref 0–1.5)
LDH SERPL L TO P-CCNC: 198 U/L — SIGNIFICANT CHANGE UP (ref 120–225)
LYMPHOCYTES # BLD AUTO: 2.36 K/UL — SIGNIFICANT CHANGE UP (ref 1–3.3)
LYMPHOCYTES # BLD AUTO: 32.6 % — SIGNIFICANT CHANGE UP (ref 13–44)
MCHC RBC-ENTMCNC: 30.3 PG — SIGNIFICANT CHANGE UP (ref 27–34)
MCHC RBC-ENTMCNC: 33.4 GM/DL — SIGNIFICANT CHANGE UP (ref 32–36)
MCV RBC AUTO: 90.8 FL — SIGNIFICANT CHANGE UP (ref 80–100)
MONOCYTES # BLD AUTO: 0.55 K/UL — SIGNIFICANT CHANGE UP (ref 0–0.9)
MONOCYTES NFR BLD AUTO: 7.6 % — SIGNIFICANT CHANGE UP (ref 2–14)
NEUTROPHILS # BLD AUTO: 4.21 K/UL — SIGNIFICANT CHANGE UP (ref 1.8–7.4)
NEUTROPHILS NFR BLD AUTO: 58.2 % — SIGNIFICANT CHANGE UP (ref 43–77)
NRBC # BLD: 0 /100 WBCS — SIGNIFICANT CHANGE UP (ref 0–0)
PLATELET # BLD AUTO: 673 K/UL — HIGH (ref 150–400)
POTASSIUM SERPL-MCNC: 3.9 MMOL/L — SIGNIFICANT CHANGE UP (ref 3.5–5.3)
POTASSIUM SERPL-SCNC: 3.9 MMOL/L — SIGNIFICANT CHANGE UP (ref 3.5–5.3)
PROT SERPL-MCNC: 6.5 G/DL — SIGNIFICANT CHANGE UP (ref 6–8.3)
RBC # BLD: 3.79 M/UL — LOW (ref 3.8–5.2)
RBC # FLD: 12.9 % — SIGNIFICANT CHANGE UP (ref 10.3–14.5)
SODIUM SERPL-SCNC: 141 MMOL/L — SIGNIFICANT CHANGE UP (ref 135–145)
WBC # BLD: 7.23 K/UL — SIGNIFICANT CHANGE UP (ref 3.8–10.5)
WBC # FLD AUTO: 7.23 K/UL — SIGNIFICANT CHANGE UP (ref 3.8–10.5)

## 2021-05-30 RX ADMIN — FAMOTIDINE 20 MILLIGRAM(S): 10 INJECTION INTRAVENOUS at 17:18

## 2021-05-30 RX ADMIN — MONTELUKAST 10 MILLIGRAM(S): 4 TABLET, CHEWABLE ORAL at 17:18

## 2021-05-30 RX ADMIN — FAMOTIDINE 20 MILLIGRAM(S): 10 INJECTION INTRAVENOUS at 06:19

## 2021-05-30 RX ADMIN — Medication 5000 UNIT(S): at 11:54

## 2021-05-30 RX ADMIN — ZINC SULFATE TAB 220 MG (50 MG ZINC EQUIVALENT) 220 MILLIGRAM(S): 220 (50 ZN) TAB at 11:54

## 2021-05-30 RX ADMIN — Medication 2000 MILLIGRAM(S): at 11:55

## 2021-05-30 RX ADMIN — Medication 6 MILLIGRAM(S): at 06:17

## 2021-05-30 RX ADMIN — ENOXAPARIN SODIUM 40 MILLIGRAM(S): 100 INJECTION SUBCUTANEOUS at 11:55

## 2021-05-30 NOTE — PROGRESS NOTE ADULT - ASSESSMENT
72 y/o female with no significant pmhx presents to ED with c/o body ache and difficulty breathing x 8 days. Patient notes  is admitted to ICU in [place] for Covid. Pt was admitted for acute hypoxic respiratory failure due to COVID.   On arrival noted 80% on RA, now on NC3L 96%. Pt didn't take covid vaccine. taking no meds at home.

## 2021-05-30 NOTE — PROGRESS NOTE ADULT - ASSESSMENT
Patient is a 71y old  Female with no significant pmhx presents to the ER for evaluation of body ache, fever and difficulty breathing x 8 days. Her  is admitted to ICU in [place] for COVID. ON arrival she found to have hypoxia to 80 %, tachycardia and tachypnea. The CXR shows Bilateral infiltrates left greater than right. She has started on Remdesivir and Dexamethasone, but after 3 doses  Remdesivir ha sbeen stopped due to positive Antibody. Currently she is on Dexamethasone and Anticoagulation. The ID consult requested to assist with further management of COVID.     # COVID pneumonia  # Acute Respiratory failure- on oxygen via NC  # COVID Ab positive- 5/24/21 - ? vaccination vs prior exposure    Would recommend:    1. Please wean off oxygen as tolerated    2. Continue Dexamethasone to complete the course or until discharge   3. Continue  supportive care   4. OOB to chair   5. COVID precaution    Attending Attestation:    Spent more than 35 minutes on total encounter, more than 50 % of the visit was spent counseling and/or coordinating care by the Attending physician. Patient is a 71y old  Female with no significant pmhx presents to the ER for evaluation of body ache, fever and difficulty breathing x 8 days. Her  is admitted to ICU in [place] for COVID. ON arrival she found to have hypoxia to 80 %, tachycardia and tachypnea. The CXR shows Bilateral infiltrates left greater than right. She has started on Remdesivir and Dexamethasone, but after 3 doses  Remdesivir ha sbeen stopped due to positive Antibody. Currently she is on Dexamethasone and Anticoagulation. The ID consult requested to assist with further management of COVID.     # COVID pneumonia  # Acute Respiratory failure- on oxygen via NC  # COVID Ab positive- 5/24/21 - ? vaccination vs prior exposure    Would recommend:    1. Continue to wean off oxygen as tolerated    2. Continue Dexamethasone to complete the course or until discharge   3. Continue  supportive care   4. OOB to chair   5. COVID precaution    Attending Attestation:    Spent more than 35 minutes on total encounter, more than 50 % of the visit was spent counseling and/or coordinating care by the Attending physician.

## 2021-05-30 NOTE — PROGRESS NOTE ADULT - SUBJECTIVE AND OBJECTIVE BOX
Time of Visit:  Patient seen and examined.     MEDICATIONS  (STANDING):  ascorbic acid 2000 milliGRAM(s) Oral daily  cholecalciferol 5000 Unit(s) Oral daily  dexAMETHasone  Injectable 6 milliGRAM(s) IV Push daily  enoxaparin Injectable 40 milliGRAM(s) SubCutaneous daily  famotidine    Tablet 20 milliGRAM(s) Oral two times a day  montelukast 10 milliGRAM(s) Oral every 24 hours  zinc sulfate 220 milliGRAM(s) Oral daily      MEDICATIONS  (PRN):  acetaminophen   Tablet .. 650 milliGRAM(s) Oral every 6 hours PRN Temp greater or equal to 38C (100.4F), Mild Pain (1 - 3)       Medications up to date at time of exam.      PHYSICAL EXAMINATION:  Patient has no new complaints.  GENERAL: The patient is a well-developed, well-nourished, in no apparent distress.     Vital Signs Last 24 Hrs  T(C): 36.7 (30 May 2021 13:53), Max: 36.9 (29 May 2021 20:17)  T(F): 98.1 (30 May 2021 13:53), Max: 98.4 (29 May 2021 20:17)  HR: 73 (30 May 2021 13:53) (63 - 81)  BP: 114/45 (30 May 2021 13:53) (111/48 - 137/58)  BP(mean): --  RR: 17 (30 May 2021 13:53) (17 - 18)  SpO2: 95% (30 May 2021 13:53) (95% - 100%)   (if applicable)    Chest Tube (if applicable)    HEENT: Head is normocephalic and atraumatic. Extraocular muscles are intact. Mucous membranes are moist.     NECK: Supple, no palpable adenopathy.    LUNGS: Clear to auscultation, no wheezing, rales, or rhonchi.    HEART: Regular rate and rhythm without murmur.    ABDOMEN: Soft, nontender, and nondistended.  No hepatosplenomegaly is noted.    : No painful voiding, no pelvic pain    EXTREMITIES: Without any cyanosis, clubbing, rash, lesions or edema.    NEUROLOGIC: Awake, alert, oriented, grossly intact    SKIN: Warm, dry, good turgor.      LABS:                        11.5   7.23  )-----------( 673      ( 30 May 2021 06:55 )             34.4     05-30    141  |  107  |  20<H>  ----------------------------<  87  3.9   |  25  |  0.52    Ca    8.6      30 May 2021 06:55    TPro  6.5  /  Alb  2.1<L>  /  TBili  0.6  /  DBili  x   /  AST  23  /  ALT  38  /  AlkPhos  74  05-30              D-Dimer Assay, Quantitative: 645 ng/mL DDU (05-30-21 @ 06:55)            MICROBIOLOGY: (if applicable)    RADIOLOGY & ADDITIONAL STUDIES:  EKG:   CXR:  ECHO:    IMPRESSION: 71y Female PAST MEDICAL & SURGICAL HISTORY:  No pertinent past medical history    No pertinent past medical history    No significant past surgical history    No pertinent past surgical history     p/w                             Impression: 72 Y/O Female presented to ED with body ache and difficulty breathing x 8 days. Patient notes  is admitted to ICU in [place] for Covid.  With O2 saturation of 80% in ED with episode f Difficulty due to Acute Hypoxic Respiratory Failure secondary to  Pneumonia from covid-19 infection . . Positive Covid 19 PCR on 05-23-21. CXR with bilateral Infiltrates.     Suggestion:  O2 requirement is decreasing   Dexamethasone 6 mg Daily x 10 days till 06-01-21 . then taper   Monitor biomarkes   DVT/ GI prophylactic.       Airborne and contact precautions.    out pt pulmonary f/u

## 2021-05-30 NOTE — PROGRESS NOTE ADULT - SUBJECTIVE AND OBJECTIVE BOX
YESSENIA BARRY  MR# [id number]  71yFemale        Patient is a 71y old  Female who presents with a chief complaint of Acute hypoxic respiratory failure (29 May 2021 17:02)      INTERVAL HPI/OVERNIGHT EVENTS:  Patient seen and examined at bedside. No notations of chest pain, palpitation, SOB, orthopnea, nausea, vomiting or abdominal pain.    ALLERGIES  No Known Allergies      MEDICATIONS  acetaminophen   Tablet .. 650 milliGRAM(s) Oral every 6 hours PRN Temp greater or equal to 38C (100.4F), Mild Pain (1 - 3)  ascorbic acid 2000 milliGRAM(s) Oral daily  cholecalciferol 5000 Unit(s) Oral daily  dexAMETHasone  Injectable 6 milliGRAM(s) IV Push daily  enoxaparin Injectable 40 milliGRAM(s) SubCutaneous daily  famotidine    Tablet 20 milliGRAM(s) Oral two times a day  montelukast 10 milliGRAM(s) Oral every 24 hours  zinc sulfate 220 milliGRAM(s) Oral daily              REVIEW OF SYSTEMS:  CONSTITUTIONAL: No fever, weight loss, or fatigue  EYES: No eye pain, visual disturbances, or discharge  ENT:  No difficulty hearing, tinnitus, vertigo; No sinus or throat pain  NECK: No pain or stiffness  RESPIRATORY: No cough, wheezing, chills or hemoptysis; No Shortness of Breath  CARDIOVASCULAR: No chest pain, palpitations, passing out, dizziness, or leg swelling  GASTROINTESTINAL: No abdominal or epigastric pain. No nausea, vomiting, or hematemesis; No diarrhea or constipation. No melena or hematochezia.  GENITOURINARY: No dysuria, frequency, hematuria, or incontinence  NEUROLOGICAL: No headaches, memory loss, loss of strength, numbness, or tremors  SKIN: No itching, burning, rashes, or lesions   LYMPH Nodes: No enlarged glands  ENDOCRINE: No heat or cold intolerance; No hair loss  MUSCULOSKELETAL: No joint pain or swelling; No muscle, back, or extremity pain  PSYCHIATRIC: No depression, anxiety, mood swings, or difficulty sleeping  HEME/LYMPH: No easy bruising, or bleeding gums  ALLERGY AND IMMUNOLOGIC: No hives or eczema	    [ ] All others negative	  [ ] Unable to obtain      T(C): 36.7 (05-30-21 @ 13:53), Max: 36.9 (05-29-21 @ 20:17)  T(F): 98.1 (05-30-21 @ 13:53), Max: 98.4 (05-29-21 @ 20:17)  HR: 73 (05-30-21 @ 13:53) (63 - 81)  BP: 114/45 (05-30-21 @ 13:53) (111/48 - 137/58)  RR: 17 (05-30-21 @ 13:53) (17 - 18)  SpO2: 95% (05-30-21 @ 13:53) (95% - 100%)  Wt(kg): --    I&O's Summary        PHYSICAL EXAM:  A X O x  HEAD:  Atraumatic, Normocephalic  EYES: EOMI, PERRLA, conjunctiva and sclera clear  NECK: Supple, No JVD, Normal thyroid  Resp: CTAB, No crackles, wheezing,   CVS: Regular rate and rhythm; No discernable murmurs, rubs, or gallops  ABD: Soft, Nontender, Nondistended; Bowel sounds present  EXTREMITIES:  2+ Peripheral Pulses, No edema  LYMPH: No dicernable lymphadenopathy noted  GENERAL: NAD, well-groomed, well-developed      LABS:                        11.5   7.23  )-----------( 673      ( 30 May 2021 06:55 )             34.4     05-30    141  |  107  |  20<H>  ----------------------------<  87  3.9   |  25  |  0.52    Ca    8.6      30 May 2021 06:55    TPro  6.5  /  Alb  2.1<L>  /  TBili  0.6  /  DBili  x   /  AST  23  /  ALT  38  /  AlkPhos  74  05-30        CAPILLARY BLOOD GLUCOSE          Troponins:  ProBNP:  Lipid Profile:   HgA1c:  TSH:           RADIOLOGY & ADDITIONAL TESTS:    Imaging Personally Reviewed:  [ ] YES  [ ] NO      Consultant(s) Notes Reviewed:  [x ] YES  [ ] NO    Care Discussed with Consultants/Other Providers [ x] YES  [ ] NO          PAST MEDICAL & SURGICAL HISTORY:  No pertinent past medical history    No pertinent past medical history    No significant past surgical history    No pertinent past surgical history          Hypoxemia    Handoff    MEWS Score    No pertinent past medical history    No pertinent past medical history    Hypoxia    COVID-19 virus infection    Acute respiratory failure with hypoxia    Pneumonia due to COVID-19 virus    Prophylactic measure    No significant past surgical history    No pertinent past surgical history    W/LOWER BACK, LOSS OF APPETITE    [email or web address]

## 2021-05-30 NOTE — PROGRESS NOTE ADULT - SUBJECTIVE AND OBJECTIVE BOX
Patient is seen and examined at the bed side, is afebrile. She remains on 1 liter oxygen via NC.  The WBC count stay normal.       REVIEW OF SYSTEMS: All other review systems are negative      ALLERGIES: No Known Allergies      Vital Signs Last 24 Hrs  T(C): 36.7 (30 May 2021 13:53), Max: 36.9 (29 May 2021 20:17)  T(F): 98.1 (30 May 2021 13:53), Max: 98.4 (29 May 2021 20:17)  HR: 73 (30 May 2021 13:53) (63 - 81)  BP: 114/45 (30 May 2021 13:53) (111/48 - 137/58)  BP(mean): --  RR: 17 (30 May 2021 13:53) (17 - 18)  SpO2: 95% (30 May 2021 13:53) (95% - 100%)      PHYSICAL EXAM:  GENERAL: Not in distress, on oxygen via NC  CHEST/LUNG: Not using accessory muscles  HEART: s1 and s2 present  ABDOMEN:  Nontender and  Nondistended  EXTREMITIES: No pedal  edema  CNS: Awake and Alert      LABS:                        11.5   7.23  )-----------( 673      ( 30 May 2021 06:55 )             34.4                           11.6   8.78  )-----------( 725      ( 28 May 2021 07:36 )             34.3                           12.5   13.72 )-----------( 792      ( 27 May 2021 08:05 )             37.0       05-30    141  |  107  |  20<H>  ----------------------------<  87  3.9   |  25  |  0.52    Ca    8.6      30 May 2021 06:55    TPro  6.5  /  Alb  2.1<L>  /  TBili  0.6  /  DBili  x   /  AST  23  /  ALT  38  /  AlkPhos  74  05-30 05-29    140  |  107  |  18  ----------------------------<  93  4.1   |  24  |  0.47<L>    Ca    8.8      29 May 2021 07:48  Phos  3.4     05-28  Mg     2.1     05-28    TPro  6.6  /  Alb  2.1<L>  /  TBili  0.6  /  DBili  x   /  AST  21  /  ALT  36  /  AlkPhos  74  05-29    Procalcitonin, Serum (05.24.21 @ 22:01)  : 0.10  C-Reactive Protein, Serum (05.23.21 @ 23:27) : 144      MEDICATIONS  (STANDING):    ascorbic acid 2000 milliGRAM(s) Oral daily  cholecalciferol 5000 Unit(s) Oral daily  dexAMETHasone  Injectable 6 milliGRAM(s) IV Push daily  enoxaparin Injectable 40 milliGRAM(s) SubCutaneous daily  famotidine    Tablet 20 milliGRAM(s) Oral two times a day  montelukast 10 milliGRAM(s) Oral every 24 hours  zinc sulfate 220 milliGRAM(s) Oral daily        RADIOLOGY & ADDITIONAL TESTS:    5/23/21 : Xray Chest 1 View- PORTABLE-Urgent (05.23.21 @ 15:08) : Bilateral infiltrates left greater than right.      MICROBIOLOGY DATA:    Respiratory Viral Panel with COVID-19 by KATLYN (05.25.21 @ 09:52)   Rapid RVP Result: Detected   SARS-CoV-2: Detected    COVID-19 Yossi Domain Antibody (05.24.21 @ 22:04)   COVID-19 Yossi Domain Antibody Result: 222.00    Culture - Blood (05.23.21 @ 18:46)   Specimen Source: .Blood Blood-Peripheral   Culture Results: No growth to date.     Culture - Blood (05.23.21 @ 18:46)   Specimen Source: .Blood Blood-Peripheral   Culture Results: No growth to date.     COVID-19 PCR . (05.23.21 @ 15:27)   COVID-19 PCR: Detected             Patient is seen and examined at the bed side, is afebrile. She remains on 1 liter oxygen via NC.  The kidney function and WBC count stay normal.       REVIEW OF SYSTEMS: All other review systems are negative      ALLERGIES: No Known Allergies      Vital Signs Last 24 Hrs  T(C): 36.7 (30 May 2021 13:53), Max: 36.9 (29 May 2021 20:17)  T(F): 98.1 (30 May 2021 13:53), Max: 98.4 (29 May 2021 20:17)  HR: 73 (30 May 2021 13:53) (63 - 81)  BP: 114/45 (30 May 2021 13:53) (111/48 - 137/58)  BP(mean): --  RR: 17 (30 May 2021 13:53) (17 - 18)  SpO2: 95% (30 May 2021 13:53) (95% - 100%)      PHYSICAL EXAM:  GENERAL: Not in distress, on oxygen via NC  CHEST/LUNG: Not using accessory muscles  HEART: s1 and s2 present  ABDOMEN:  Nontender and  Nondistended  EXTREMITIES: No pedal  edema  CNS: Awake and Alert      LABS:                        11.5   7.23  )-----------( 673      ( 30 May 2021 06:55 )             34.4                           11.6   8.78  )-----------( 725      ( 28 May 2021 07:36 )             34.3                           12.5   13.72 )-----------( 792      ( 27 May 2021 08:05 )             37.0       05-30    141  |  107  |  20<H>  ----------------------------<  87  3.9   |  25  |  0.52    Ca    8.6      30 May 2021 06:55    TPro  6.5  /  Alb  2.1<L>  /  TBili  0.6  /  DBili  x   /  AST  23  /  ALT  38  /  AlkPhos  74  05-30 05-29    140  |  107  |  18  ----------------------------<  93  4.1   |  24  |  0.47<L>    Ca    8.8      29 May 2021 07:48  Phos  3.4     05-28  Mg     2.1     05-28    TPro  6.6  /  Alb  2.1<L>  /  TBili  0.6  /  DBili  x   /  AST  21  /  ALT  36  /  AlkPhos  74  05-29    Procalcitonin, Serum (05.24.21 @ 22:01)  : 0.10  C-Reactive Protein, Serum (05.23.21 @ 23:27) : 144      MEDICATIONS  (STANDING):    ascorbic acid 2000 milliGRAM(s) Oral daily  cholecalciferol 5000 Unit(s) Oral daily  dexAMETHasone  Injectable 6 milliGRAM(s) IV Push daily  enoxaparin Injectable 40 milliGRAM(s) SubCutaneous daily  famotidine    Tablet 20 milliGRAM(s) Oral two times a day  montelukast 10 milliGRAM(s) Oral every 24 hours  zinc sulfate 220 milliGRAM(s) Oral daily        RADIOLOGY & ADDITIONAL TESTS:    5/23/21 : Xray Chest 1 View- PORTABLE-Urgent (05.23.21 @ 15:08) : Bilateral infiltrates left greater than right.      MICROBIOLOGY DATA:    Respiratory Viral Panel with COVID-19 by KATLYN (05.25.21 @ 09:52)   Rapid RVP Result: Detected   SARS-CoV-2: Detected    COVID-19 Yossi Domain Antibody (05.24.21 @ 22:04)   COVID-19 Yossi Domain Antibody Result: 222.00    Culture - Blood (05.23.21 @ 18:46)   Specimen Source: .Blood Blood-Peripheral   Culture Results: No growth to date.     Culture - Blood (05.23.21 @ 18:46)   Specimen Source: .Blood Blood-Peripheral   Culture Results: No growth to date.     COVID-19 PCR . (05.23.21 @ 15:27)   COVID-19 PCR: Detected

## 2021-05-31 LAB
ALBUMIN SERPL ELPH-MCNC: 2 G/DL — LOW (ref 3.5–5)
ALP SERPL-CCNC: 64 U/L — SIGNIFICANT CHANGE UP (ref 40–120)
ALT FLD-CCNC: 40 U/L DA — SIGNIFICANT CHANGE UP (ref 10–60)
ANION GAP SERPL CALC-SCNC: 5 MMOL/L — SIGNIFICANT CHANGE UP (ref 5–17)
AST SERPL-CCNC: 23 U/L — SIGNIFICANT CHANGE UP (ref 10–40)
BASOPHILS # BLD AUTO: 0.02 K/UL — SIGNIFICANT CHANGE UP (ref 0–0.2)
BASOPHILS NFR BLD AUTO: 0.3 % — SIGNIFICANT CHANGE UP (ref 0–2)
BILIRUB SERPL-MCNC: 0.6 MG/DL — SIGNIFICANT CHANGE UP (ref 0.2–1.2)
BUN SERPL-MCNC: 18 MG/DL — SIGNIFICANT CHANGE UP (ref 7–18)
CALCIUM SERPL-MCNC: 8.4 MG/DL — SIGNIFICANT CHANGE UP (ref 8.4–10.5)
CHLORIDE SERPL-SCNC: 107 MMOL/L — SIGNIFICANT CHANGE UP (ref 96–108)
CO2 SERPL-SCNC: 27 MMOL/L — SIGNIFICANT CHANGE UP (ref 22–31)
CREAT SERPL-MCNC: 0.48 MG/DL — LOW (ref 0.5–1.3)
D DIMER BLD IA.RAPID-MCNC: 509 NG/ML DDU — HIGH
EOSINOPHIL # BLD AUTO: 0.02 K/UL — SIGNIFICANT CHANGE UP (ref 0–0.5)
EOSINOPHIL NFR BLD AUTO: 0.3 % — SIGNIFICANT CHANGE UP (ref 0–6)
FERRITIN SERPL-MCNC: 301 NG/ML — HIGH (ref 15–150)
GLUCOSE SERPL-MCNC: 88 MG/DL — SIGNIFICANT CHANGE UP (ref 70–99)
HCT VFR BLD CALC: 31.7 % — LOW (ref 34.5–45)
HGB BLD-MCNC: 10.7 G/DL — LOW (ref 11.5–15.5)
IMM GRANULOCYTES NFR BLD AUTO: 0.5 % — SIGNIFICANT CHANGE UP (ref 0–1.5)
LDH SERPL L TO P-CCNC: 157 U/L — SIGNIFICANT CHANGE UP (ref 120–225)
LYMPHOCYTES # BLD AUTO: 2.47 K/UL — SIGNIFICANT CHANGE UP (ref 1–3.3)
LYMPHOCYTES # BLD AUTO: 33.4 % — SIGNIFICANT CHANGE UP (ref 13–44)
MCHC RBC-ENTMCNC: 30.7 PG — SIGNIFICANT CHANGE UP (ref 27–34)
MCHC RBC-ENTMCNC: 33.8 GM/DL — SIGNIFICANT CHANGE UP (ref 32–36)
MCV RBC AUTO: 91.1 FL — SIGNIFICANT CHANGE UP (ref 80–100)
MONOCYTES # BLD AUTO: 0.58 K/UL — SIGNIFICANT CHANGE UP (ref 0–0.9)
MONOCYTES NFR BLD AUTO: 7.8 % — SIGNIFICANT CHANGE UP (ref 2–14)
NEUTROPHILS # BLD AUTO: 4.27 K/UL — SIGNIFICANT CHANGE UP (ref 1.8–7.4)
NEUTROPHILS NFR BLD AUTO: 57.7 % — SIGNIFICANT CHANGE UP (ref 43–77)
NRBC # BLD: 0 /100 WBCS — SIGNIFICANT CHANGE UP (ref 0–0)
PLATELET # BLD AUTO: 577 K/UL — HIGH (ref 150–400)
POTASSIUM SERPL-MCNC: 3.8 MMOL/L — SIGNIFICANT CHANGE UP (ref 3.5–5.3)
POTASSIUM SERPL-SCNC: 3.8 MMOL/L — SIGNIFICANT CHANGE UP (ref 3.5–5.3)
PROT SERPL-MCNC: 6 G/DL — SIGNIFICANT CHANGE UP (ref 6–8.3)
RBC # BLD: 3.48 M/UL — LOW (ref 3.8–5.2)
RBC # FLD: 13 % — SIGNIFICANT CHANGE UP (ref 10.3–14.5)
SODIUM SERPL-SCNC: 139 MMOL/L — SIGNIFICANT CHANGE UP (ref 135–145)
WBC # BLD: 7.4 K/UL — SIGNIFICANT CHANGE UP (ref 3.8–10.5)
WBC # FLD AUTO: 7.4 K/UL — SIGNIFICANT CHANGE UP (ref 3.8–10.5)

## 2021-05-31 RX ADMIN — FAMOTIDINE 20 MILLIGRAM(S): 10 INJECTION INTRAVENOUS at 17:43

## 2021-05-31 RX ADMIN — Medication 2000 MILLIGRAM(S): at 12:31

## 2021-05-31 RX ADMIN — FAMOTIDINE 20 MILLIGRAM(S): 10 INJECTION INTRAVENOUS at 06:55

## 2021-05-31 RX ADMIN — Medication 5000 UNIT(S): at 12:31

## 2021-05-31 RX ADMIN — MONTELUKAST 10 MILLIGRAM(S): 4 TABLET, CHEWABLE ORAL at 17:43

## 2021-05-31 RX ADMIN — ENOXAPARIN SODIUM 40 MILLIGRAM(S): 100 INJECTION SUBCUTANEOUS at 12:31

## 2021-05-31 RX ADMIN — Medication 6 MILLIGRAM(S): at 06:54

## 2021-05-31 RX ADMIN — ZINC SULFATE TAB 220 MG (50 MG ZINC EQUIVALENT) 220 MILLIGRAM(S): 220 (50 ZN) TAB at 12:31

## 2021-05-31 NOTE — PROGRESS NOTE ADULT - SUBJECTIVE AND OBJECTIVE BOX
Time of Visit:  Patient seen and examined.     MEDICATIONS  (STANDING):  ascorbic acid 2000 milliGRAM(s) Oral daily  cholecalciferol 5000 Unit(s) Oral daily  dexAMETHasone  Injectable 6 milliGRAM(s) IV Push daily  enoxaparin Injectable 40 milliGRAM(s) SubCutaneous daily  famotidine    Tablet 20 milliGRAM(s) Oral two times a day  montelukast 10 milliGRAM(s) Oral every 24 hours  zinc sulfate 220 milliGRAM(s) Oral daily      MEDICATIONS  (PRN):  acetaminophen   Tablet .. 650 milliGRAM(s) Oral every 6 hours PRN Temp greater or equal to 38C (100.4F), Mild Pain (1 - 3)       Medications up to date at time of exam.      PHYSICAL EXAMINATION:  Patient has no new complaints.  GENERAL: The patient is a well-developed, well-nourished, in no apparent distress.     Vital Signs Last 24 Hrs  T(C): 36.5 (31 May 2021 14:30), Max: 36.9 (31 May 2021 05:46)  T(F): 97.7 (31 May 2021 14:30), Max: 98.4 (31 May 2021 05:46)  HR: 91 (31 May 2021 14:30) (71 - 91)  BP: 137/62 (31 May 2021 14:30) (113/62 - 137/62)  BP(mean): --  RR: 15 (31 May 2021 14:30) (15 - 17)  SpO2: 95% (31 May 2021 14:30) (94% - 95%)   (if applicable)    Chest Tube (if applicable)    HEENT: Head is normocephalic and atraumatic. Extraocular muscles are intact. Mucous membranes are moist.     NECK: Supple, no palpable adenopathy.    LUNGS: Clear to auscultation, no wheezing, rales, or rhonchi.    HEART: Regular rate and rhythm without murmur.    ABDOMEN: Soft, nontender, and nondistended.  No hepatosplenomegaly is noted.    : No painful voiding, no pelvic pain    EXTREMITIES: Without any cyanosis, clubbing, rash, lesions or edema.    NEUROLOGIC: Awake, alert, oriented, grossly intact    SKIN: Warm, dry, good turgor.      LABS:                        10.7   7.40  )-----------( 577      ( 31 May 2021 07:04 )             31.7     05-31    139  |  107  |  18  ----------------------------<  88  3.8   |  27  |  0.48<L>    Ca    8.4      31 May 2021 07:04    TPro  6.0  /  Alb  2.0<L>  /  TBili  0.6  /  DBili  x   /  AST  23  /  ALT  40  /  AlkPhos  64  05-31              D-Dimer Assay, Quantitative: 509 ng/mL DDU (05-31-21 @ 07:04)            MICROBIOLOGY: (if applicable)    RADIOLOGY & ADDITIONAL STUDIES:  EKG:   CXR:  ECHO:    IMPRESSION: 71y Female PAST MEDICAL & SURGICAL HISTORY:  No pertinent past medical history    No pertinent past medical history    No significant past surgical history    No pertinent past surgical history     p/w           RECOMMENDATIONS:   Time of Visit:  Patient seen and examined.     MEDICATIONS  (STANDING):  ascorbic acid 2000 milliGRAM(s) Oral daily  cholecalciferol 5000 Unit(s) Oral daily  dexAMETHasone  Injectable 6 milliGRAM(s) IV Push daily  enoxaparin Injectable 40 milliGRAM(s) SubCutaneous daily  famotidine    Tablet 20 milliGRAM(s) Oral two times a day  montelukast 10 milliGRAM(s) Oral every 24 hours  zinc sulfate 220 milliGRAM(s) Oral daily      MEDICATIONS  (PRN):  acetaminophen   Tablet .. 650 milliGRAM(s) Oral every 6 hours PRN Temp greater or equal to 38C (100.4F), Mild Pain (1 - 3)       Medications up to date at time of exam.      PHYSICAL EXAMINATION:  Patient has no new complaints.  GENERAL: The patient is a well-developed, well-nourished, in no apparent distress.     Vital Signs Last 24 Hrs  T(C): 36.5 (31 May 2021 14:30), Max: 36.9 (31 May 2021 05:46)  T(F): 97.7 (31 May 2021 14:30), Max: 98.4 (31 May 2021 05:46)  HR: 91 (31 May 2021 14:30) (71 - 91)  BP: 137/62 (31 May 2021 14:30) (113/62 - 137/62)  BP(mean): --  RR: 15 (31 May 2021 14:30) (15 - 17)  SpO2: 95% (31 May 2021 14:30) (94% - 95%)   (if applicable)    Chest Tube (if applicable)    HEENT: Head is normocephalic and atraumatic. Extraocular muscles are intact. Mucous membranes are moist.     NECK: Supple, no palpable adenopathy.    LUNGS: Clear to auscultation, no wheezing, rales, or rhonchi.    HEART: Regular rate and rhythm without murmur.    ABDOMEN: Soft, nontender, and nondistended.  No hepatosplenomegaly is noted.    : No painful voiding, no pelvic pain    EXTREMITIES: Without any cyanosis, clubbing, rash, lesions or edema.    NEUROLOGIC: Awake, alert, oriented, grossly intact    SKIN: Warm, dry, good turgor.      LABS:                        10.7   7.40  )-----------( 577      ( 31 May 2021 07:04 )             31.7     05-31    139  |  107  |  18  ----------------------------<  88  3.8   |  27  |  0.48<L>    Ca    8.4      31 May 2021 07:04    TPro  6.0  /  Alb  2.0<L>  /  TBili  0.6  /  DBili  x   /  AST  23  /  ALT  40  /  AlkPhos  64  05-31              D-Dimer Assay, Quantitative: 509 ng/mL DDU (05-31-21 @ 07:04)            MICROBIOLOGY: (if applicable)    RADIOLOGY & ADDITIONAL STUDIES:  EKG:   CXR:  ECHO:    IMPRESSION: 71y Female PAST MEDICAL & SURGICAL HISTORY:  No pertinent past medical history    No pertinent past medical history    No significant past surgical history    No pertinent past surgical history     p/w           Impression: 70 Y/O Female presented to ED with body ache and difficulty breathing x 8 days. Patient notes  is admitted to ICU in [place] for Covid.  With O2 saturation of 80% in ED with episode f Difficulty due to Acute Hypoxic Respiratory Failure secondary to  Pneumonia from covid-19 infection . . Positive Covid 19 PCR on 05-23-21. CXR with bilateral Infiltrates.     Suggestion:  O2 requirement is decreasing   Dexamethasone 6 mg Daily x 10 days till 06-01-21 . then taper   Monitor biomarkes   DVT/ GI prophylactic.       Airborne and contact precautions.    out pt pulmonary f/u

## 2021-05-31 NOTE — PROGRESS NOTE ADULT - SUBJECTIVE AND OBJECTIVE BOX
PGY-1 Progress Note discussed with attending    PAGER #: [[phone]] TILL 5:00 PM  PLEASE CONTACT ON CALL TEAM:  - On Call Team (Please refer to Shanda) FROM 5:00 PM - 8:30PM  - Nightfloat Team FROM 8:30 -7:30 AM    CHIEF COMPLAINT & BRIEF HOSPITAL COURSE:  70 y/o female with no significant pmhx presents to ED with c/o body ache and difficulty breathing x 8 days. Patient notes  is admitted to ICU in [place] for Covid. Pt was admitted for acute hypoxic respiratory failure due to COVID. On arrival noted 80% on RA, now on NC3L 96%. Pt didn't take covid vaccine. taking no meds at home. COVID PCR + positive. Admitted for AHRF 2/2 COVID PNA. CXR showed b/l infiltrates L>>R. Started on Dexamethason and Remdesivir on 5/24. COVID Ab resulted high positive, Remdesivir was discontinued on 5/25. BCx NGTD. RVP positive COVID. BCx NGTD. Tapered down from 5L NC to 2L NC to 1L now. Working with PT OOBTC. CXR 5/27 with interval clearing of right lung base. No overnight events. PT eval pending. VSS, no fevers. Markers stable. Plan for discharge with home oxygen if qualifies.    INTERVAL HPI/OVERNIGHT EVENTS:   Patient seen and examined at bedside. No events overnight. Patient is doing much better, saturating well in 1L. will taper off and monitor. She denies any complains.     REVIEW OF SYSTEMS:  CONSTITUTIONAL: No fever, weight loss, or fatigue  RESPIRATORY: No cough, wheezing, chills or hemoptysis; No shortness of breath  CARDIOVASCULAR: No chest pain, palpitations, dizziness, or leg swelling  GASTROINTESTINAL: No abdominal pain. No nausea, vomiting, or hematemesis; No diarrhea or constipation. No melena or hematochezia.  GENITOURINARY: No dysuria or hematuria, urinary frequency  MUSCULOSKELETAL: No pain, no Limited ROM  NEUROLOGICAL: No headaches, memory loss, loss of strength, numbness, or tremors  SKIN: No itching, burning, rashes, or lesions     MEDICATIONS  (STANDING):  ascorbic acid 2000 milliGRAM(s) Oral daily  cholecalciferol 5000 Unit(s) Oral daily  dexAMETHasone  Injectable 6 milliGRAM(s) IV Push daily  enoxaparin Injectable 40 milliGRAM(s) SubCutaneous daily  famotidine    Tablet 20 milliGRAM(s) Oral two times a day  montelukast 10 milliGRAM(s) Oral every 24 hours  zinc sulfate 220 milliGRAM(s) Oral daily    MEDICATIONS  (PRN):  acetaminophen   Tablet .. 650 milliGRAM(s) Oral every 6 hours PRN Temp greater or equal to 38C (100.4F), Mild Pain (1 - 3)      Vital Signs Last 24 Hrs  T(C): 36.9 (31 May 2021 05:46), Max: 36.9 (31 May 2021 05:46)  T(F): 98.4 (31 May 2021 05:46), Max: 98.4 (31 May 2021 05:46)  HR: 71 (31 May 2021 05:46) (71 - 90)  BP: 113/62 (31 May 2021 05:46) (113/62 - 130/64)  BP(mean): --  RR: 15 (31 May 2021 05:46) (15 - 17)  SpO2: 94% (31 May 2021 05:46) (94% - 95%)    PHYSICAL EXAMINATION:  GENERAL: NAD, well built, pleasant   HEAD:  Atraumatic, Normocephalic  EYES:  conjunctiva and sclera clear, no scleral icterus  NECK: Supple, No JVD, Normal thyroid  CHEST/LUNG: Very diminished breath sounds. Rales throughout   HEART: Regular rate and rhythm; No murmurs, rubs, or gallops  ABDOMEN: Soft, Nontender, Nondistended; Bowel sounds present  NERVOUS SYSTEM:  Alert & Oriented X3,  Strength 5/5 in upper and lower extremities, sensation intact  EXTREMITIES:  2+ Peripheral Pulses, No clubbing, cyanosis, or edema  SKIN: warm dry, no lesions noted                          10.7   7.40  )-----------( 577      ( 31 May 2021 07:04 )             31.7 05-31    139  |  107  |  18  ----------------------------<  88  3.8   |  27  |  0.48<L>    Ca    8.4      31 May 2021 07:04    TPro  6.0  /  Alb  2.0<L>  /  TBili  0.6  /  DBili  x   /  AST  23  /  ALT  40  /  AlkPhos  64  05-31    LIVER FUNCTIONS - ( 31 May 2021 07:04 )  Alb: 2.0 g/dL / Pro: 6.0 g/dL / ALK PHOS: 64 U/L / ALT: 40 U/L DA / AST: 23 U/L / GGT: x                 I&O's Summary        RADIOLOGY & ADDITIONAL TESTS:

## 2021-05-31 NOTE — PROGRESS NOTE ADULT - ASSESSMENT
72 y/o female with no significant pmhx presents to ED with c/o body ache and difficulty breathing x 8 days. Patient notes  is admitted to ICU in [place] for Covid. Pt was admitted for acute hypoxic respiratory failure due to COVID.   On arrival noted 80% on RA, now on NC3L 96%. Pt didn't take covid vaccine. taking no meds at home.

## 2021-05-31 NOTE — PROGRESS NOTE ADULT - SUBJECTIVE AND OBJECTIVE BOX
Patient is seen and examined at the bed side, is afebrile. She remains on 1 liter oxygen via NC.       REVIEW OF SYSTEMS: All other review systems are negative      ALLERGIES: No Known Allergies      Vital Signs Last 24 Hrs  T(C): 36.5 (31 May 2021 14:30), Max: 36.9 (31 May 2021 05:46)  T(F): 97.7 (31 May 2021 14:30), Max: 98.4 (31 May 2021 05:46)  HR: 91 (31 May 2021 14:30) (71 - 91)  BP: 137/62 (31 May 2021 14:30) (113/62 - 137/62)  BP(mean): --  RR: 15 (31 May 2021 14:30) (15 - 17)  SpO2: 95% (31 May 2021 14:30) (94% - 95%)      PHYSICAL EXAM:  GENERAL: Not in distress, on oxygen via NC  CHEST/LUNG: Not using accessory muscles  HEART: s1 and s2 present  ABDOMEN:  Nontender and  Nondistended  EXTREMITIES: No pedal  edema  CNS: Awake and Alert      LABS:                        10.7   7.40  )-----------( 577      ( 31 May 2021 07:04 )             31.7                11.6   8.78  )-----------( 725      ( 28 May 2021 07:36 )             34.3                           12.5   13.72 )-----------( 792      ( 27 May 2021 08:05 )             37.0       05-31    139  |  107  |  18  ----------------------------<  88  3.8   |  27  |  0.48<L>    Ca    8.4      31 May 2021 07:04    TPro  6.0  /  Alb  2.0<L>  /  TBili  0.6  /  DBili  x   /  AST  23  /  ALT  40  /  AlkPhos  64  05-31 05-30    141  |  107  |  20<H>  ----------------------------<  87  3.9   |  25  |  0.52    Ca    8.6      30 May 2021 06:55    TPro  6.5  /  Alb  2.1<L>  /  TBili  0.6  /  DBili  x   /  AST  23  /  ALT  38  /  AlkPhos  74  05-30    Procalcitonin, Serum (05.24.21 @ 22:01)  : 0.10  C-Reactive Protein, Serum (05.23.21 @ 23:27) : 144      MEDICATIONS  (STANDING):    ascorbic acid 2000 milliGRAM(s) Oral daily  cholecalciferol 5000 Unit(s) Oral daily  dexAMETHasone  Injectable 6 milliGRAM(s) IV Push daily  enoxaparin Injectable 40 milliGRAM(s) SubCutaneous daily  famotidine    Tablet 20 milliGRAM(s) Oral two times a day  montelukast 10 milliGRAM(s) Oral every 24 hours  zinc sulfate 220 milliGRAM(s) Oral daily      RADIOLOGY & ADDITIONAL TESTS:    5/23/21 : Xray Chest 1 View- PORTABLE-Urgent (05.23.21 @ 15:08) : Bilateral infiltrates left greater than right.      MICROBIOLOGY DATA:    Respiratory Viral Panel with COVID-19 by KATLYN (05.25.21 @ 09:52)   Rapid RVP Result: Detected   SARS-CoV-2: Detected    COVID-19 Yossi Domain Antibody (05.24.21 @ 22:04)   COVID-19 Yossi Domain Antibody Result: 222.00    Culture - Blood (05.23.21 @ 18:46)   Specimen Source: .Blood Blood-Peripheral   Culture Results: No growth to date.     Culture - Blood (05.23.21 @ 18:46)   Specimen Source: .Blood Blood-Peripheral   Culture Results: No growth to date.     COVID-19 PCR . (05.23.21 @ 15:27)   COVID-19 PCR: Detected           Patient is seen and examined at the bed side, is afebrile. NO new events. The WBC count and kidney function stay normal.       REVIEW OF SYSTEMS: All other review systems are negative      ALLERGIES: No Known Allergies      Vital Signs Last 24 Hrs  T(C): 36.5 (31 May 2021 14:30), Max: 36.9 (31 May 2021 05:46)  T(F): 97.7 (31 May 2021 14:30), Max: 98.4 (31 May 2021 05:46)  HR: 91 (31 May 2021 14:30) (71 - 91)  BP: 137/62 (31 May 2021 14:30) (113/62 - 137/62)  BP(mean): --  RR: 15 (31 May 2021 14:30) (15 - 17)  SpO2: 95% (31 May 2021 14:30) (94% - 95%)      PHYSICAL EXAM:  GENERAL: Not in distress, on oxygen via NC  CHEST/LUNG: Not using accessory muscles  HEART: s1 and s2 present  ABDOMEN:  Nontender and  Nondistended  EXTREMITIES: No pedal  edema  CNS: Awake and Alert      LABS:                        10.7   7.40  )-----------( 577      ( 31 May 2021 07:04 )             31.7                11.6   8.78  )-----------( 725      ( 28 May 2021 07:36 )             34.3                           12.5   13.72 )-----------( 792      ( 27 May 2021 08:05 )             37.0       05-31    139  |  107  |  18  ----------------------------<  88  3.8   |  27  |  0.48<L>    Ca    8.4      31 May 2021 07:04    TPro  6.0  /  Alb  2.0<L>  /  TBili  0.6  /  DBili  x   /  AST  23  /  ALT  40  /  AlkPhos  64  05-31 05-30    141  |  107  |  20<H>  ----------------------------<  87  3.9   |  25  |  0.52    Ca    8.6      30 May 2021 06:55    TPro  6.5  /  Alb  2.1<L>  /  TBili  0.6  /  DBili  x   /  AST  23  /  ALT  38  /  AlkPhos  74  05-30    Procalcitonin, Serum (05.24.21 @ 22:01)  : 0.10  C-Reactive Protein, Serum (05.23.21 @ 23:27) : 144      MEDICATIONS  (STANDING):    ascorbic acid 2000 milliGRAM(s) Oral daily  cholecalciferol 5000 Unit(s) Oral daily  dexAMETHasone  Injectable 6 milliGRAM(s) IV Push daily  enoxaparin Injectable 40 milliGRAM(s) SubCutaneous daily  famotidine    Tablet 20 milliGRAM(s) Oral two times a day  montelukast 10 milliGRAM(s) Oral every 24 hours  zinc sulfate 220 milliGRAM(s) Oral daily      RADIOLOGY & ADDITIONAL TESTS:    5/23/21 : Xray Chest 1 View- PORTABLE-Urgent (05.23.21 @ 15:08) : Bilateral infiltrates left greater than right.      MICROBIOLOGY DATA:    Respiratory Viral Panel with COVID-19 by KATLYN (05.25.21 @ 09:52)   Rapid RVP Result: Detected   SARS-CoV-2: Detected    COVID-19 Yossi Domain Antibody (05.24.21 @ 22:04)   COVID-19 Yossi Domain Antibody Result: 222.00    Culture - Blood (05.23.21 @ 18:46)   Specimen Source: .Blood Blood-Peripheral   Culture Results: No growth to date.     Culture - Blood (05.23.21 @ 18:46)   Specimen Source: .Blood Blood-Peripheral   Culture Results: No growth to date.     COVID-19 PCR . (05.23.21 @ 15:27)   COVID-19 PCR: Detected

## 2021-05-31 NOTE — PROGRESS NOTE ADULT - ASSESSMENT
Patient is a 71y old  Female with no significant pmhx presents to the ER for evaluation of body ache, fever and difficulty breathing x 8 days. Her  is admitted to ICU in [place] for COVID. ON arrival she found to have hypoxia to 80 %, tachycardia and tachypnea. The CXR shows Bilateral infiltrates left greater than right. She has started on Remdesivir and Dexamethasone, but after 3 doses  Remdesivir ha sbeen stopped due to positive Antibody. Currently she is on Dexamethasone and Anticoagulation. The ID consult requested to assist with further management of COVID.     # COVID pneumonia  # Acute Respiratory failure- on oxygen via NC  # COVID Ab positive- 5/24/21 - ? vaccination vs prior exposure    Would recommend:    1. Continue to wean off oxygen as tolerated    2. Continue Dexamethasone to complete the course or until discharge   3. Continue  supportive care   4. OOB to chair   5. COVID precaution    Attending Attestation:    Spent more than 35 minutes on total encounter, more than 50 % of the visit was spent counseling and/or coordinating care by the Attending physician. Patient is a 71y old  Female with no significant pmhx presents to the ER for evaluation of body ache, fever and difficulty breathing x 8 days. Her  is admitted to ICU in [place] for COVID. ON arrival she found to have hypoxia to 80 %, tachycardia and tachypnea. The CXR shows Bilateral infiltrates left greater than right. She has started on Remdesivir and Dexamethasone, but after 3 doses  Remdesivir ha sbeen stopped due to positive Antibody. Currently she is on Dexamethasone and Anticoagulation. The ID consult requested to assist with further management of COVID.     # COVID pneumonia  # Acute Respiratory failure- on oxygen via NC  # COVID Ab positive- 5/24/21 - ? vaccination vs prior exposure    Would recommend:      1. PT/OOB to chair   2. Continue to wean off oxygen as tolerated    3. Continue Dexamethasone to complete the course or until discharge   4. Continue  supportive care   5. COVID precaution    Attending Attestation:    Spent more than 35 minutes on total encounter, more than 50 % of the visit was spent counseling and/or coordinating care by the Attending physician.

## 2021-06-01 VITALS
RESPIRATION RATE: 18 BRPM | SYSTOLIC BLOOD PRESSURE: 111 MMHG | OXYGEN SATURATION: 92 % | TEMPERATURE: 98 F | DIASTOLIC BLOOD PRESSURE: 47 MMHG | HEART RATE: 76 BPM

## 2021-06-01 LAB
ANION GAP SERPL CALC-SCNC: 10 MMOL/L — SIGNIFICANT CHANGE UP (ref 5–17)
BUN SERPL-MCNC: 15 MG/DL — SIGNIFICANT CHANGE UP (ref 7–18)
CALCIUM SERPL-MCNC: 8.5 MG/DL — SIGNIFICANT CHANGE UP (ref 8.4–10.5)
CHLORIDE SERPL-SCNC: 107 MMOL/L — SIGNIFICANT CHANGE UP (ref 96–108)
CO2 SERPL-SCNC: 24 MMOL/L — SIGNIFICANT CHANGE UP (ref 22–31)
CREAT SERPL-MCNC: 0.5 MG/DL — SIGNIFICANT CHANGE UP (ref 0.5–1.3)
FERRITIN SERPL-MCNC: 314 NG/ML — HIGH (ref 15–150)
GLUCOSE SERPL-MCNC: 102 MG/DL — HIGH (ref 70–99)
HCT VFR BLD CALC: 33.7 % — LOW (ref 34.5–45)
HGB BLD-MCNC: 11.2 G/DL — LOW (ref 11.5–15.5)
LDH SERPL L TO P-CCNC: 169 U/L — SIGNIFICANT CHANGE UP (ref 120–225)
MCHC RBC-ENTMCNC: 30.3 PG — SIGNIFICANT CHANGE UP (ref 27–34)
MCHC RBC-ENTMCNC: 33.2 GM/DL — SIGNIFICANT CHANGE UP (ref 32–36)
MCV RBC AUTO: 91.1 FL — SIGNIFICANT CHANGE UP (ref 80–100)
NRBC # BLD: 0 /100 WBCS — SIGNIFICANT CHANGE UP (ref 0–0)
PLATELET # BLD AUTO: 549 K/UL — HIGH (ref 150–400)
POTASSIUM SERPL-MCNC: 3.9 MMOL/L — SIGNIFICANT CHANGE UP (ref 3.5–5.3)
POTASSIUM SERPL-SCNC: 3.9 MMOL/L — SIGNIFICANT CHANGE UP (ref 3.5–5.3)
RBC # BLD: 3.7 M/UL — LOW (ref 3.8–5.2)
RBC # FLD: 12.8 % — SIGNIFICANT CHANGE UP (ref 10.3–14.5)
SODIUM SERPL-SCNC: 141 MMOL/L — SIGNIFICANT CHANGE UP (ref 135–145)
WBC # BLD: 7.25 K/UL — SIGNIFICANT CHANGE UP (ref 3.8–10.5)
WBC # FLD AUTO: 7.25 K/UL — SIGNIFICANT CHANGE UP (ref 3.8–10.5)

## 2021-06-01 PROCEDURE — 97161 PT EVAL LOW COMPLEX 20 MIN: CPT

## 2021-06-01 PROCEDURE — 84443 ASSAY THYROID STIM HORMONE: CPT

## 2021-06-01 PROCEDURE — 83615 LACTATE (LD) (LDH) ENZYME: CPT

## 2021-06-01 PROCEDURE — 85652 RBC SED RATE AUTOMATED: CPT

## 2021-06-01 PROCEDURE — 96374 THER/PROPH/DIAG INJ IV PUSH: CPT

## 2021-06-01 PROCEDURE — 84100 ASSAY OF PHOSPHORUS: CPT

## 2021-06-01 PROCEDURE — 82306 VITAMIN D 25 HYDROXY: CPT

## 2021-06-01 PROCEDURE — 85027 COMPLETE CBC AUTOMATED: CPT

## 2021-06-01 PROCEDURE — 36415 COLL VENOUS BLD VENIPUNCTURE: CPT

## 2021-06-01 PROCEDURE — 86803 HEPATITIS C AB TEST: CPT

## 2021-06-01 PROCEDURE — 83605 ASSAY OF LACTIC ACID: CPT

## 2021-06-01 PROCEDURE — 84484 ASSAY OF TROPONIN QUANT: CPT

## 2021-06-01 PROCEDURE — 93005 ELECTROCARDIOGRAM TRACING: CPT

## 2021-06-01 PROCEDURE — 71045 X-RAY EXAM CHEST 1 VIEW: CPT

## 2021-06-01 PROCEDURE — 87040 BLOOD CULTURE FOR BACTERIA: CPT

## 2021-06-01 PROCEDURE — 0225U NFCT DS DNA&RNA 21 SARSCOV2: CPT

## 2021-06-01 PROCEDURE — 82962 GLUCOSE BLOOD TEST: CPT

## 2021-06-01 PROCEDURE — 85610 PROTHROMBIN TIME: CPT

## 2021-06-01 PROCEDURE — 82728 ASSAY OF FERRITIN: CPT

## 2021-06-01 PROCEDURE — 80053 COMPREHEN METABOLIC PANEL: CPT

## 2021-06-01 PROCEDURE — 80061 LIPID PANEL: CPT

## 2021-06-01 PROCEDURE — 86140 C-REACTIVE PROTEIN: CPT

## 2021-06-01 PROCEDURE — 82607 VITAMIN B-12: CPT

## 2021-06-01 PROCEDURE — 83880 ASSAY OF NATRIURETIC PEPTIDE: CPT

## 2021-06-01 PROCEDURE — 82746 ASSAY OF FOLIC ACID SERUM: CPT

## 2021-06-01 PROCEDURE — 83735 ASSAY OF MAGNESIUM: CPT

## 2021-06-01 PROCEDURE — 86769 SARS-COV-2 COVID-19 ANTIBODY: CPT

## 2021-06-01 PROCEDURE — 84145 PROCALCITONIN (PCT): CPT

## 2021-06-01 PROCEDURE — 80048 BASIC METABOLIC PNL TOTAL CA: CPT

## 2021-06-01 PROCEDURE — 87635 SARS-COV-2 COVID-19 AMP PRB: CPT

## 2021-06-01 PROCEDURE — 85379 FIBRIN DEGRADATION QUANT: CPT

## 2021-06-01 PROCEDURE — 99285 EMERGENCY DEPT VISIT HI MDM: CPT | Mod: 25

## 2021-06-01 PROCEDURE — 85025 COMPLETE CBC W/AUTO DIFF WBC: CPT

## 2021-06-01 PROCEDURE — 83520 IMMUNOASSAY QUANT NOS NONAB: CPT

## 2021-06-01 PROCEDURE — 83036 HEMOGLOBIN GLYCOSYLATED A1C: CPT

## 2021-06-01 PROCEDURE — 85730 THROMBOPLASTIN TIME PARTIAL: CPT

## 2021-06-01 RX ORDER — ASPIRIN/CALCIUM CARB/MAGNESIUM 324 MG
1 TABLET ORAL
Qty: 30 | Refills: 0
Start: 2021-06-01 | End: 2021-06-30

## 2021-06-01 RX ORDER — CHOLECALCIFEROL (VITAMIN D3) 125 MCG
1 CAPSULE ORAL
Qty: 30 | Refills: 0
Start: 2021-06-01 | End: 2021-06-30

## 2021-06-01 RX ORDER — FAMOTIDINE 10 MG/ML
1 INJECTION INTRAVENOUS
Qty: 30 | Refills: 0
Start: 2021-06-01 | End: 2021-06-30

## 2021-06-01 RX ADMIN — MONTELUKAST 10 MILLIGRAM(S): 4 TABLET, CHEWABLE ORAL at 17:21

## 2021-06-01 RX ADMIN — ENOXAPARIN SODIUM 40 MILLIGRAM(S): 100 INJECTION SUBCUTANEOUS at 12:21

## 2021-06-01 RX ADMIN — FAMOTIDINE 20 MILLIGRAM(S): 10 INJECTION INTRAVENOUS at 17:21

## 2021-06-01 RX ADMIN — Medication 2000 MILLIGRAM(S): at 12:22

## 2021-06-01 RX ADMIN — Medication 5000 UNIT(S): at 12:22

## 2021-06-01 RX ADMIN — Medication 6 MILLIGRAM(S): at 05:51

## 2021-06-01 RX ADMIN — FAMOTIDINE 20 MILLIGRAM(S): 10 INJECTION INTRAVENOUS at 05:51

## 2021-06-01 RX ADMIN — ZINC SULFATE TAB 220 MG (50 MG ZINC EQUIVALENT) 220 MILLIGRAM(S): 220 (50 ZN) TAB at 12:21

## 2021-06-01 NOTE — PROGRESS NOTE ADULT - SUBJECTIVE AND OBJECTIVE BOX
Time of Visit:  Patient seen and examined.     MEDICATIONS  (STANDING):  ascorbic acid 2000 milliGRAM(s) Oral daily  cholecalciferol 5000 Unit(s) Oral daily  enoxaparin Injectable 40 milliGRAM(s) SubCutaneous daily  famotidine    Tablet 20 milliGRAM(s) Oral two times a day  montelukast 10 milliGRAM(s) Oral every 24 hours  zinc sulfate 220 milliGRAM(s) Oral daily      MEDICATIONS  (PRN):  acetaminophen   Tablet .. 650 milliGRAM(s) Oral every 6 hours PRN Temp greater or equal to 38C (100.4F), Mild Pain (1 - 3)       Medications up to date at time of exam.      PHYSICAL EXAMINATION:  Patient has no new complaints.  GENERAL: The patient is a well-developed, well-nourished, in no apparent distress.     Vital Signs Last 24 Hrs  T(C): 36.7 (01 Jun 2021 17:26), Max: 36.9 (31 May 2021 21:02)  T(F): 98 (01 Jun 2021 17:26), Max: 98.5 (31 May 2021 21:02)  HR: 76 (01 Jun 2021 17:26) (65 - 86)  BP: 111/47 (01 Jun 2021 17:26) (108/50 - 122/42)  BP(mean): --  RR: 18 (01 Jun 2021 17:26) (16 - 18)  SpO2: 92% (01 Jun 2021 17:26) (92% - 96%)   (if applicable)    Chest Tube (if applicable)    HEENT: Head is normocephalic and atraumatic. Extraocular muscles are intact. Mucous membranes are moist.     NECK: Supple, no palpable adenopathy.    LUNGS: Clear to auscultation, no wheezing, rales, or rhonchi.    HEART: Regular rate and rhythm without murmur.    ABDOMEN: Soft, nontender, and nondistended.  No hepatosplenomegaly is noted.    : No painful voiding, no pelvic pain    EXTREMITIES: Without any cyanosis, clubbing, rash, lesions or edema.    NEUROLOGIC: Awake, alert, oriented, grossly intact    SKIN: Warm, dry, good turgor.      LABS:                        11.2   7.25  )-----------( 549      ( 01 Jun 2021 08:17 )             33.7     06-01    141  |  107  |  15  ----------------------------<  102<H>  3.9   |  24  |  0.50    Ca    8.5      01 Jun 2021 08:17    TPro  6.0  /  Alb  2.0<L>  /  TBili  0.6  /  DBili  x   /  AST  23  /  ALT  40  /  AlkPhos  64  05-31                        MICROBIOLOGY: (if applicable)    RADIOLOGY & ADDITIONAL STUDIES:  EKG:   CXR:  ECHO:    IMPRESSION: 71y Female PAST MEDICAL & SURGICAL HISTORY:  No pertinent past medical history    No pertinent past medical history    No significant past surgical history    No pertinent past surgical history     p/w         Impression: 72 Y/O Female presented to ED with body ache and difficulty breathing x 8 days. Patient notes  is admitted to ICU in [place] for Covid.  With O2 saturation of 80% in ED with episode f Difficulty due to Acute Hypoxic Respiratory Failure secondary to  Pneumonia from covid-19 infection . . Positive Covid 19 PCR on 05-23-21. CXR with bilateral Infiltrates.     Suggestion:  tolerating room air   Dexamethasone 6 mg Daily x 10 days till 06-01-21 . then taper  DVT/ GI prophylactic.       Airborne and contact precautions.    out pt pulmonary f/u  d/c planning as per primary team

## 2021-06-01 NOTE — PROGRESS NOTE ADULT - NSICDXPILOT_GEN_ALL_CORE
[place]

## 2021-06-01 NOTE — PROGRESS NOTE ADULT - PROVIDER SPECIALTY LIST ADULT
Infectious Disease
Infectious Disease
Pulmonology
Infectious Disease
Infectious Disease
Pulmonology
Infectious Disease
Internal Medicine
Infectious Disease
Pulmonology
Pulmonology
Internal Medicine

## 2021-06-01 NOTE — PROGRESS NOTE ADULT - ATTENDING COMMENTS
Awaiting d/c planning for home. Pt's hypoxia has significantly improved, no longer qualifying or meriting home O2, sating 98% on 1lpm. Can d/c NC O2 all together.
COVID positive with subjective c/o SOB and objective mild hypoxia (95% on 3lpm)    -F/U COVID Ab counts  -F/U CT chest angio pending further hypoxia (at present no hemodynamic parameters for PE concern)  -Start on IV dexamethasone  -Start on Iv Remdesivir   -Vitamin C 2,000mg q12 / Zinc 220mg Daily / Vit D Daily   -Alessandra IV hydration  -O2 Support vias NC O2 for now  -Pulmonary consult (Dr Boggs)  -Cardiology consult (Dr Turcios)  -GI/DVT PPX
Awaiting d/c planning for home.
Awaiting d/c planning for home. Pt's hypoxia has significantly improved, no longer qualifying or meriting home O2, sating 98% on 1lpm. Can d/c NC O2 all together.
D/C planning for home today.
Pt medically stable for d/c home on taper dose steroids and home NC O2 at 2lpm. SW & CW on case given pt's lack of medical insurance.
Pt's hypoxia improving, cont taper, now stable on NC O2 @ 2lpm. Medically stable for discharge on tapering dosed steroid and home O2 pending repeat CXR in a.m.
COVID positive with subjective c/o SOB and objective mild hypoxia (95% on 3lpm)    -COVID Ab count convery prior exposure / vaccination  -No merit for CT chest angio at present given nonprogressive hypoxia thus far (at present no hemodynamic parameters for PE concern) -now 100% on 5lpm NC O2.  -Taper down NC O2 to 3lpm  -Cont on IV dexamethasone  -Cont on Iv Remdesivir   -On Vitamin C 2,000mg q12 / Zinc 220mg Daily / Vit D Daily   -Alessandra IV hydration  -D/C planning for home on the above pending further stabilization by Thursday  -GI/DVT PPX
COVID positive with subjective c/o SOB and objective mild hypoxia (95% on 3lpm)    -COVID Ab count conveys prior exposure / vaccination  -No merit for CT chest angio at present given nonprogressive hypoxia thus far (at present no hemodynamic parameters for PE concern) -now 100% on 5lpm NC O2.  -Taper down NC O2 to 3lpm  -Cont on IV dexamethasone  -Cont on Iv Remdesivir   -On Vitamin C 2,000mg q12 / Zinc 220mg Daily / Vit D Daily   -Change protonix to famotidine  -Alessandra IV hydration  -D/C planning for home on the above pending further stabilization by Thursday  -GI/DVT PPX

## 2021-06-01 NOTE — PROGRESS NOTE ADULT - REASON FOR ADMISSION
Acute hypoxic respiratory failure

## 2021-06-01 NOTE — PROGRESS NOTE ADULT - SUBJECTIVE AND OBJECTIVE BOX
PGY-1 Progress Note discussed with attending    PAGER #: [[phone]] TILL 5:00 PM  PLEASE CONTACT ON CALL TEAM:  - On Call Team (Please refer to Shanda) FROM 5:00 PM - 8:30PM  - Nightfloat Team FROM 8:30 -7:30 AM    INTERVAL HPI/OVERNIGHT EVENTS:   Patient seen and examined at bedside. No events overnight. Patient is tolerating room air. Denies any complains. Is stable for discharge.     REVIEW OF SYSTEMS:  CONSTITUTIONAL: No fever, weight loss, or fatigue  RESPIRATORY: No cough, wheezing, chills or hemoptysis; No shortness of breath  CARDIOVASCULAR: No chest pain, palpitations, dizziness, or leg swelling  GASTROINTESTINAL: No abdominal pain. No nausea, vomiting, or hematemesis; No diarrhea or constipation. No melena or hematochezia.  GENITOURINARY: No dysuria or hematuria, urinary frequency  MUSCULOSKELETAL: No pain, no Limited ROM  NEUROLOGICAL: No headaches, memory loss, loss of strength, numbness, or tremors  SKIN: No itching, burning, rashes, or lesions     MEDICATIONS  (STANDING):  ascorbic acid 2000 milliGRAM(s) Oral daily  cholecalciferol 5000 Unit(s) Oral daily  enoxaparin Injectable 40 milliGRAM(s) SubCutaneous daily  famotidine    Tablet 20 milliGRAM(s) Oral two times a day  montelukast 10 milliGRAM(s) Oral every 24 hours  zinc sulfate 220 milliGRAM(s) Oral daily    MEDICATIONS  (PRN):  acetaminophen   Tablet .. 650 milliGRAM(s) Oral every 6 hours PRN Temp greater or equal to 38C (100.4F), Mild Pain (1 - 3)      Vital Signs Last 24 Hrs  T(C): 36.9 (01 Jun 2021 05:29), Max: 36.9 (31 May 2021 21:02)  T(F): 98.4 (01 Jun 2021 05:29), Max: 98.5 (31 May 2021 21:02)  HR: 65 (01 Jun 2021 05:29) (65 - 91)  BP: 108/50 (01 Jun 2021 05:29) (108/50 - 137/62)  BP(mean): --  RR: 16 (01 Jun 2021 05:29) (15 - 17)  SpO2: 96% (01 Jun 2021 05:29) (95% - 96%)    PHYSICAL EXAMINATION:  GENERAL: NAD, well built  HEAD:  Atraumatic, Normocephalic  EYES:  conjunctiva and sclera clear, no scleral icterus  NECK: Supple, No JVD, Normal thyroid  CHEST/LUNG: Clearer to auscultation, still somewhat diminished breath sounds. improving.  HEART: Regular rate and rhythm; No murmurs, rubs, or gallops  ABDOMEN: Soft, Nontender, Nondistended; Bowel sounds present  NERVOUS SYSTEM:  Alert & Oriented X3,  Strength 5/5 in upper and lower extremities, sensation intact  EXTREMITIES:  2+ Peripheral Pulses, No clubbing, cyanosis, or edema  SKIN: warm dry, no lesions noted                          11.2   7.25  )-----------( 549      ( 01 Jun 2021 08:17 )             33.7     06-01    141  |  107  |  15  ----------------------------<  102<H>  3.9   |  24  |  0.50    Ca    8.5      01 Jun 2021 08:17    TPro  6.0  /  Alb  2.0<L>  /  TBili  0.6  /  DBili  x   /  AST  23  /  ALT  40  /  AlkPhos  64  05-31    LIVER FUNCTIONS - ( 31 May 2021 07:04 )  Alb: 2.0 g/dL / Pro: 6.0 g/dL / ALK PHOS: 64 U/L / ALT: 40 U/L DA / AST: 23 U/L / GGT: x                 I&O's Summary        RADIOLOGY & ADDITIONAL TESTS:

## 2021-06-01 NOTE — PROGRESS NOTE ADULT - ASSESSMENT
72 y/o female with no significant pmhx presents to ED with c/o body ache and difficulty breathing x 8 days. Patient notes  is admitted to ICU in [place] for Covid. Pt was admitted for acute hypoxic respiratory failure due to COVID.   On arrival noted 80% on RA, now on NC3L 96%. Pt didn't take covid vaccine. taking no meds at home.

## 2021-09-16 NOTE — DISCHARGE NOTE PROVIDER - NSFTFHOMEHTHYNRD_GEN_ALL_CORE
Yes
PAST SURGICAL HISTORY:  H/O cystoscopy     S/P percutaneous endoscopic gastrostomy (PEG) tube placement     Suprapubic catheter

## 2022-01-03 NOTE — DISCHARGE NOTE NURSING/CASE MANAGEMENT/SOCIAL WORK - CAREGIVER ADDRESS
"Spoke with pt. Pt wondering if Xanax came in XR form. Pt currently takes Xanax 0.5mg BID PRN and states "just having high anxiety right now."     Spoke with Dr. Flower and informed of above. Per Dr. Flower Xanax does come in an XR form, however, out of her scope of practice. If pt feels needing Xanax XR we can place referral order for psychiatry to further eval and manage.       " [address] [place] [address] [address] [facility] [place] [address]
